# Patient Record
Sex: MALE | Race: WHITE | NOT HISPANIC OR LATINO
[De-identification: names, ages, dates, MRNs, and addresses within clinical notes are randomized per-mention and may not be internally consistent; named-entity substitution may affect disease eponyms.]

---

## 2018-01-16 ENCOUNTER — TRANSCRIPTION ENCOUNTER (OUTPATIENT)
Age: 43
End: 2018-01-16

## 2018-01-17 ENCOUNTER — APPOINTMENT (OUTPATIENT)
Dept: OTOLARYNGOLOGY | Facility: CLINIC | Age: 43
End: 2018-01-17
Payer: COMMERCIAL

## 2018-01-17 VITALS
DIASTOLIC BLOOD PRESSURE: 81 MMHG | BODY MASS INDEX: 37.1 KG/M2 | SYSTOLIC BLOOD PRESSURE: 118 MMHG | WEIGHT: 265 LBS | HEIGHT: 71 IN | HEART RATE: 128 BPM | TEMPERATURE: 97.7 F

## 2018-01-17 DIAGNOSIS — Z86.39 PERSONAL HISTORY OF OTHER ENDOCRINE, NUTRITIONAL AND METABOLIC DISEASE: ICD-10-CM

## 2018-01-17 DIAGNOSIS — Z83.3 FAMILY HISTORY OF DIABETES MELLITUS: ICD-10-CM

## 2018-01-17 DIAGNOSIS — J32.2 CHRONIC ETHMOIDAL SINUSITIS: ICD-10-CM

## 2018-01-17 DIAGNOSIS — J34.2 DEVIATED NASAL SEPTUM: ICD-10-CM

## 2018-01-17 DIAGNOSIS — Z80.6 FAMILY HISTORY OF LEUKEMIA: ICD-10-CM

## 2018-01-17 DIAGNOSIS — F15.90 OTHER STIMULANT USE, UNSPECIFIED, UNCOMPLICATED: ICD-10-CM

## 2018-01-17 DIAGNOSIS — Z82.49 FAMILY HISTORY OF ISCHEMIC HEART DISEASE AND OTHER DISEASES OF THE CIRCULATORY SYSTEM: ICD-10-CM

## 2018-01-17 DIAGNOSIS — J32.0 CHRONIC MAXILLARY SINUSITIS: ICD-10-CM

## 2018-01-17 PROBLEM — Z00.00 ENCOUNTER FOR PREVENTIVE HEALTH EXAMINATION: Status: ACTIVE | Noted: 2018-01-17

## 2018-01-17 PROCEDURE — 99204 OFFICE O/P NEW MOD 45 MIN: CPT | Mod: 25

## 2018-01-17 PROCEDURE — 31231 NASAL ENDOSCOPY DX: CPT

## 2018-01-17 RX ORDER — AMOXICILLIN AND CLAVULANATE POTASSIUM 875; 125 MG/1; MG/1
875-125 TABLET, COATED ORAL
Refills: 0 | Status: ACTIVE | COMMUNITY

## 2018-01-17 RX ORDER — PREDNISONE 10 MG/1
10 TABLET ORAL
Qty: 25 | Refills: 0 | Status: ACTIVE | COMMUNITY
Start: 2018-01-17 | End: 1900-01-01

## 2018-01-17 RX ORDER — AMOXICILLIN AND CLAVULANATE POTASSIUM 875; 125 MG/1; MG/1
875-125 TABLET, COATED ORAL
Qty: 28 | Refills: 1 | Status: ACTIVE | COMMUNITY
Start: 2018-01-17 | End: 1900-01-01

## 2018-01-19 ENCOUNTER — TRANSCRIPTION ENCOUNTER (OUTPATIENT)
Age: 43
End: 2018-01-19

## 2019-01-01 ENCOUNTER — TRANSCRIPTION ENCOUNTER (OUTPATIENT)
Age: 44
End: 2019-01-01

## 2019-06-06 ENCOUNTER — TRANSCRIPTION ENCOUNTER (OUTPATIENT)
Age: 44
End: 2019-06-06

## 2021-01-06 ENCOUNTER — TRANSCRIPTION ENCOUNTER (OUTPATIENT)
Age: 46
End: 2021-01-06

## 2021-01-10 ENCOUNTER — TRANSCRIPTION ENCOUNTER (OUTPATIENT)
Age: 46
End: 2021-01-10

## 2021-01-13 ENCOUNTER — TRANSCRIPTION ENCOUNTER (OUTPATIENT)
Age: 46
End: 2021-01-13

## 2021-10-11 ENCOUNTER — INPATIENT (INPATIENT)
Facility: HOSPITAL | Age: 46
LOS: 6 days | Discharge: HOME | DRG: 481 | End: 2021-10-18
Attending: SPECIALIST | Admitting: ORTHOPAEDIC SURGERY
Payer: COMMERCIAL

## 2021-10-11 VITALS
HEART RATE: 69 BPM | RESPIRATION RATE: 18 BRPM | OXYGEN SATURATION: 100 % | SYSTOLIC BLOOD PRESSURE: 143 MMHG | DIASTOLIC BLOOD PRESSURE: 84 MMHG | TEMPERATURE: 98 F | WEIGHT: 229.06 LBS | HEIGHT: 71 IN
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
## 2021-10-11 DIAGNOSIS — E78.5 HYPERLIPIDEMIA, UNSPECIFIED: ICD-10-CM

## 2021-10-11 DIAGNOSIS — B34.8 OTHER VIRAL INFECTIONS OF UNSPECIFIED SITE: ICD-10-CM

## 2021-10-11 DIAGNOSIS — E66.9 OBESITY, UNSPECIFIED: ICD-10-CM

## 2021-10-11 DIAGNOSIS — J06.9 ACUTE UPPER RESPIRATORY INFECTION, UNSPECIFIED: ICD-10-CM

## 2021-10-11 DIAGNOSIS — S72.422A DISPLACED FRACTURE OF LATERAL CONDYLE OF LEFT FEMUR, INITIAL ENCOUNTER FOR CLOSED FRACTURE: ICD-10-CM

## 2021-10-11 DIAGNOSIS — F41.9 ANXIETY DISORDER, UNSPECIFIED: ICD-10-CM
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
## 2021-10-11 DIAGNOSIS — E78.00 PURE HYPERCHOLESTEROLEMIA, UNSPECIFIED: ICD-10-CM
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
## 2021-10-11 DIAGNOSIS — S82.002A UNSPECIFIED FRACTURE OF LEFT PATELLA, INITIAL ENCOUNTER FOR CLOSED FRACTURE: ICD-10-CM
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
## 2021-10-11 DIAGNOSIS — S72.413A DISPLACED UNSPECIFIED CONDYLE FRACTURE OF LOWER END OF UNSPECIFIED FEMUR, INITIAL ENCOUNTER FOR CLOSED FRACTURE: ICD-10-CM

## 2021-10-11 DIAGNOSIS — E87.1 HYPO-OSMOLALITY AND HYPONATREMIA: ICD-10-CM

## 2021-10-11 LAB
ALBUMIN SERPL ELPH-MCNC: 4.3 G/DL — SIGNIFICANT CHANGE UP (ref 3.3–5)
ALP SERPL-CCNC: 69 U/L — SIGNIFICANT CHANGE UP (ref 40–120)
ALT FLD-CCNC: 28 U/L — SIGNIFICANT CHANGE UP (ref 10–45)
ANION GAP SERPL CALC-SCNC: 10 MMOL/L — SIGNIFICANT CHANGE UP (ref 5–17)
APTT BLD: 31.6 SEC — SIGNIFICANT CHANGE UP (ref 27.5–35.5)
AST SERPL-CCNC: 36 U/L — SIGNIFICANT CHANGE UP (ref 10–40)
BASOPHILS # BLD AUTO: 0.02 K/UL — SIGNIFICANT CHANGE UP (ref 0–0.2)
BASOPHILS NFR BLD AUTO: 0.1 % — SIGNIFICANT CHANGE UP (ref 0–2)
BILIRUB SERPL-MCNC: 0.4 MG/DL — SIGNIFICANT CHANGE UP (ref 0.2–1.2)
BLD GP AB SCN SERPL QL: NEGATIVE — SIGNIFICANT CHANGE UP
BUN SERPL-MCNC: 12 MG/DL — SIGNIFICANT CHANGE UP (ref 7–23)
CALCIUM SERPL-MCNC: 9.4 MG/DL — SIGNIFICANT CHANGE UP (ref 8.4–10.5)
CHLORIDE SERPL-SCNC: 103 MMOL/L — SIGNIFICANT CHANGE UP (ref 96–108)
CO2 SERPL-SCNC: 25 MMOL/L — SIGNIFICANT CHANGE UP (ref 22–31)
CREAT SERPL-MCNC: 0.98 MG/DL — SIGNIFICANT CHANGE UP (ref 0.5–1.3)
EOSINOPHIL # BLD AUTO: 0 K/UL — SIGNIFICANT CHANGE UP (ref 0–0.5)
EOSINOPHIL NFR BLD AUTO: 0 % — SIGNIFICANT CHANGE UP (ref 0–6)
GLUCOSE SERPL-MCNC: 119 MG/DL — HIGH (ref 70–99)
HCT VFR BLD CALC: 44.1 % — SIGNIFICANT CHANGE UP (ref 39–50)
HCT VFR BLD CALC: 44.5 % — SIGNIFICANT CHANGE UP (ref 39–50)
HGB BLD-MCNC: 15.2 G/DL — SIGNIFICANT CHANGE UP (ref 13–17)
HGB BLD-MCNC: 15.7 G/DL — SIGNIFICANT CHANGE UP (ref 13–17)
IMM GRANULOCYTES NFR BLD AUTO: 0.5 % — SIGNIFICANT CHANGE UP (ref 0–1.5)
INR BLD: 1.07 — SIGNIFICANT CHANGE UP (ref 0.88–1.16)
LYMPHOCYTES # BLD AUTO: 1.1 K/UL — SIGNIFICANT CHANGE UP (ref 1–3.3)
LYMPHOCYTES # BLD AUTO: 6.6 % — LOW (ref 13–44)
MCHC RBC-ENTMCNC: 29.2 PG — SIGNIFICANT CHANGE UP (ref 27–34)
MCHC RBC-ENTMCNC: 30.4 PG — SIGNIFICANT CHANGE UP (ref 27–34)
MCHC RBC-ENTMCNC: 34.2 GM/DL — SIGNIFICANT CHANGE UP (ref 32–36)
MCHC RBC-ENTMCNC: 35.6 GM/DL — SIGNIFICANT CHANGE UP (ref 32–36)
MCV RBC AUTO: 85.5 FL — SIGNIFICANT CHANGE UP (ref 80–100)
MCV RBC AUTO: 85.6 FL — SIGNIFICANT CHANGE UP (ref 80–100)
MONOCYTES # BLD AUTO: 0.83 K/UL — SIGNIFICANT CHANGE UP (ref 0–0.9)
MONOCYTES NFR BLD AUTO: 5 % — SIGNIFICANT CHANGE UP (ref 2–14)
NEUTROPHILS # BLD AUTO: 14.63 K/UL — HIGH (ref 1.8–7.4)
NEUTROPHILS NFR BLD AUTO: 87.8 % — HIGH (ref 43–77)
NRBC # BLD: 0 /100 WBCS — SIGNIFICANT CHANGE UP (ref 0–0)
NRBC # BLD: 0 /100 WBCS — SIGNIFICANT CHANGE UP (ref 0–0)
PLATELET # BLD AUTO: 305 K/UL — SIGNIFICANT CHANGE UP (ref 150–400)
PLATELET # BLD AUTO: 320 K/UL — SIGNIFICANT CHANGE UP (ref 150–400)
POTASSIUM SERPL-MCNC: 4 MMOL/L — SIGNIFICANT CHANGE UP (ref 3.5–5.3)
POTASSIUM SERPL-SCNC: 4 MMOL/L — SIGNIFICANT CHANGE UP (ref 3.5–5.3)
PROT SERPL-MCNC: 7.5 G/DL — SIGNIFICANT CHANGE UP (ref 6–8.3)
PROTHROM AB SERPL-ACNC: 12.8 SEC — SIGNIFICANT CHANGE UP (ref 10.6–13.6)
RBC # BLD: 5.16 M/UL — SIGNIFICANT CHANGE UP (ref 4.2–5.8)
RBC # BLD: 5.2 M/UL — SIGNIFICANT CHANGE UP (ref 4.2–5.8)
RBC # FLD: 12 % — SIGNIFICANT CHANGE UP (ref 10.3–14.5)
RBC # FLD: 12.1 % — SIGNIFICANT CHANGE UP (ref 10.3–14.5)
RH IG SCN BLD-IMP: POSITIVE — SIGNIFICANT CHANGE UP
RH IG SCN BLD-IMP: POSITIVE — SIGNIFICANT CHANGE UP
SARS-COV-2 RNA SPEC QL NAA+PROBE: NEGATIVE — SIGNIFICANT CHANGE UP
SODIUM SERPL-SCNC: 138 MMOL/L — SIGNIFICANT CHANGE UP (ref 135–145)
WBC # BLD: 16.06 K/UL — HIGH (ref 3.8–10.5)
WBC # BLD: 16.67 K/UL — HIGH (ref 3.8–10.5)
WBC # FLD AUTO: 16.06 K/UL — HIGH (ref 3.8–10.5)
WBC # FLD AUTO: 16.67 K/UL — HIGH (ref 3.8–10.5)

## 2021-10-11 PROCEDURE — 71045 X-RAY EXAM CHEST 1 VIEW: CPT | Mod: 26

## 2021-10-11 PROCEDURE — 73700 CT LOWER EXTREMITY W/O DYE: CPT | Mod: 26,LT,MA

## 2021-10-11 PROCEDURE — 73560 X-RAY EXAM OF KNEE 1 OR 2: CPT | Mod: 26,59,LT

## 2021-10-11 PROCEDURE — 99221 1ST HOSP IP/OBS SF/LOW 40: CPT

## 2021-10-11 PROCEDURE — 99285 EMERGENCY DEPT VISIT HI MDM: CPT

## 2021-10-11 PROCEDURE — 73564 X-RAY EXAM KNEE 4 OR MORE: CPT | Mod: 26,LT

## 2021-10-11 RX ORDER — SODIUM CHLORIDE 9 MG/ML
1000 INJECTION, SOLUTION INTRAVENOUS
Refills: 0 | Status: DISCONTINUED | OUTPATIENT
Start: 2021-10-11 | End: 2021-10-12

## 2021-10-11 RX ORDER — OXYCODONE HYDROCHLORIDE 5 MG/1
5 TABLET ORAL EVERY 4 HOURS
Refills: 0 | Status: DISCONTINUED | OUTPATIENT
Start: 2021-10-11 | End: 2021-10-15

## 2021-10-11 RX ORDER — HEPARIN SODIUM 5000 [USP'U]/ML
5000 INJECTION INTRAVENOUS; SUBCUTANEOUS EVERY 8 HOURS
Refills: 0 | Status: DISCONTINUED | OUTPATIENT
Start: 2021-10-11 | End: 2021-10-12

## 2021-10-11 RX ORDER — ACETAMINOPHEN 500 MG
650 TABLET ORAL EVERY 6 HOURS
Refills: 0 | Status: DISCONTINUED | OUTPATIENT
Start: 2021-10-11 | End: 2021-10-15

## 2021-10-11 RX ORDER — MORPHINE SULFATE 50 MG/1
6 CAPSULE, EXTENDED RELEASE ORAL ONCE
Refills: 0 | Status: DISCONTINUED | OUTPATIENT
Start: 2021-10-11 | End: 2021-10-11

## 2021-10-11 RX ORDER — INFLUENZA VIRUS VACCINE 15; 15; 15; 15 UG/.5ML; UG/.5ML; UG/.5ML; UG/.5ML
0.5 SUSPENSION INTRAMUSCULAR ONCE
Refills: 0 | Status: COMPLETED | OUTPATIENT
Start: 2021-10-11 | End: 2021-10-11

## 2021-10-11 RX ORDER — OXYCODONE HYDROCHLORIDE 5 MG/1
10 TABLET ORAL EVERY 4 HOURS
Refills: 0 | Status: DISCONTINUED | OUTPATIENT
Start: 2021-10-11 | End: 2021-10-15

## 2021-10-11 RX ORDER — HYDROMORPHONE HYDROCHLORIDE 2 MG/ML
0.5 INJECTION INTRAMUSCULAR; INTRAVENOUS; SUBCUTANEOUS ONCE
Refills: 0 | Status: DISCONTINUED | OUTPATIENT
Start: 2021-10-11 | End: 2021-10-11

## 2021-10-11 RX ORDER — HYDROMORPHONE HYDROCHLORIDE 2 MG/ML
0.5 INJECTION INTRAMUSCULAR; INTRAVENOUS; SUBCUTANEOUS EVERY 4 HOURS
Refills: 0 | Status: DISCONTINUED | OUTPATIENT
Start: 2021-10-11 | End: 2021-10-15

## 2021-10-11 RX ORDER — LANOLIN ALCOHOL/MO/W.PET/CERES
5 CREAM (GRAM) TOPICAL AT BEDTIME
Refills: 0 | Status: DISCONTINUED | OUTPATIENT
Start: 2021-10-11 | End: 2021-10-15

## 2021-10-11 RX ORDER — MAGNESIUM HYDROXIDE 400 MG/1
30 TABLET, CHEWABLE ORAL DAILY
Refills: 0 | Status: DISCONTINUED | OUTPATIENT
Start: 2021-10-11 | End: 2021-10-15

## 2021-10-11 RX ADMIN — MORPHINE SULFATE 6 MILLIGRAM(S): 50 CAPSULE, EXTENDED RELEASE ORAL at 13:20

## 2021-10-11 RX ADMIN — Medication 650 MILLIGRAM(S): at 21:35

## 2021-10-11 RX ADMIN — HYDROMORPHONE HYDROCHLORIDE 0.5 MILLIGRAM(S): 2 INJECTION INTRAMUSCULAR; INTRAVENOUS; SUBCUTANEOUS at 19:24

## 2021-10-11 RX ADMIN — Medication 650 MILLIGRAM(S): at 20:09

## 2021-10-11 RX ADMIN — MORPHINE SULFATE 6 MILLIGRAM(S): 50 CAPSULE, EXTENDED RELEASE ORAL at 12:23

## 2021-10-11 RX ADMIN — HEPARIN SODIUM 5000 UNIT(S): 5000 INJECTION INTRAVENOUS; SUBCUTANEOUS at 21:22

## 2021-10-11 RX ADMIN — HYDROMORPHONE HYDROCHLORIDE 0.5 MILLIGRAM(S): 2 INJECTION INTRAMUSCULAR; INTRAVENOUS; SUBCUTANEOUS at 13:35

## 2021-10-11 RX ADMIN — Medication 5 MILLIGRAM(S): at 22:04

## 2021-10-11 NOTE — ED ADULT NURSE NOTE - OBJECTIVE STATEMENT
Pt BIBEMS for left knee pain while working out at the gym today. Denies LOC or head trauma. B/L leg sensation intact, pulses present and able to move toes. Denies N/V/D, cp, SOB at this time.

## 2021-10-11 NOTE — H&P ADULT - PROBLEM SELECTOR PLAN 1
-Discussed with Dr. Sidhu   -Left knee patella dislocation closed reduced in the ED, Frederick CARRANZA at bedside to assist. A knee immobilizer was then applied. Patient tolerated procedure well and remained neurovascularly intact post-reduction. After reduction, left knee non-tender to palpation. Post-reduction xrays/CT showing better alignment of patella.   -CT showing left femoral condyle fracture and left patellar pole fracture   -plan to admit to orthopedic service for surgical intervention this week  -will consult medicine for co-management/medial optimization prior to OR; labs/EKG pending review by medical clearance   -pain control  -Non-weight bearing left lower extremity in knee immobilizer at all times - NO knee flexion  -pain control   -pt unsure of the doses of his home medications, states  will bring in medications this evening - will complete med rec once medications obtained  -surgical consent to be obtained once definitive surgical plan established -Discussed with Dr. Sidhu   -On admission to ED pt had left patella dislocation which was closed reduced in the ED, Frederick CARRANZA at bedside to assist. A knee immobilizer was then applied. Patient tolerated procedure well and remained neurovascularly intact post-reduction. After reduction, left knee non-tender to palpation. Post-reduction xrays/CT showing better alignment of patella.   -CT showing left femoral condyle fracture and left patellar pole fracture - plan to admit to orthopedic service for surgical intervention this week  -will consult medicine for co-management/medial optimization prior to OR; labs/EKG pending review by medical clearance   -pain control  -Non-weight bearing left lower extremity in knee immobilizer at all times - NO knee flexion  -pain control   -pt unsure of the doses of his home medications, states  will bring in medications this evening - will complete med rec once medications obtained  -surgical consent to be obtained once definitive surgical plan established -Discussed with Dr. Sidhu   -On admission to ED pt had left patella dislocation which was closed reduced in the ED, Frederick CARRANZA at bedside to assist. A knee immobilizer was then applied. Patient tolerated procedure well and remained neurovascularly intact post-reduction. After reduction, left knee non-tender to palpation. Post-reduction xrays/CT showing better alignment of patella.   -CT showing left femoral condyle fracture and left patellar pole fracture - plan to admit to orthopedic service for surgical intervention this week  -will consult medicine for co-management/medial optimization prior to OR; labs/EKG pending review by medical clearance   -pain control  -Non-weight bearing left lower extremity in knee immobilizer at all times - NO knee flexion  -pain control   -pt unsure of the doses of his home medications, states  will bring in medications this evening - will complete med rec once medications obtained  -surgical consent to be obtained once definitive surgical plan established    ADDENDUM: Patient's care being taken over by Dr. Lan

## 2021-10-11 NOTE — ED PROVIDER NOTE - PHYSICAL EXAMINATION
CONSTITUTIONAL: Well-appearing;  in no apparent distress.   HEAD: Normocephalic; atraumatic.   EYES: PERRL; EOM intact; conjunctiva and sclera clear  ENT: normal nose; no rhinorrhea; normal pharynx with no erythema or lesions.   NECK: Supple; non-tender;   CARDIOVASCULAR: rrr, no audible murmurs   RESPIRATORY: Breathing easily;   MSK: L knee - held in flexion, patella deviated laterally, +swelling, sensation intact. distal pulses intact   EXT: No cyanosis or edema; N/V intact  SKIN: Normal for age and race; warm; dry; good turgor; no apparent lesions or rash.

## 2021-10-11 NOTE — ED PROVIDER NOTE - CARE PLAN
Principal Discharge DX:	Dislocation of left patella  Secondary Diagnosis:	Fracture, femur, distal   1

## 2021-10-11 NOTE — H&P ADULT - HISTORY OF PRESENT ILLNESS
The patient is a 46M history of high cholesterol and anxiety presenting with left knee pain s/p injury at the gym. The patient states he was doing "jumping lunges" at the gym and landed wrong, falling to the floor with immediate left knee pain and inability to bear weight. Was subsequently taken to the ER by ambulance. States he has no prior history of dislocations or fractures. Currently complains of left knee pain, denies numbness/tingling of bilateral lower extremities. Denies pain in any other extremities/joints.

## 2021-10-11 NOTE — ED ADULT NURSE NOTE - NSIMPLEMENTINTERV_GEN_ALL_ED
Implemented All Fall Risk Interventions:  [place] to call system. Call bell, personal items and telephone within reach. Instruct patient to call for assistance. Room bathroom lighting operational. Non-slip footwear when patient is off stretcher. Physically safe environment: no spills, clutter or unnecessary equipment. Stretcher in lowest position, wheels locked, appropriate side rails in place. Provide visual cue, wrist band, yellow gown, etc. Monitor gait and stability. Monitor for mental status changes and reorient to person, place, and time. Review medications for side effects contributing to fall risk. Reinforce activity limits and safety measures with patient and family.

## 2021-10-11 NOTE — H&P ADULT - ASSESSMENT
46M with left patella dislocation s/p closed reduction in the ED and left femoral condyle fracture  46M with left distal femur fracture

## 2021-10-11 NOTE — ED ADULT NURSE NOTE - CAS EDP DISCH DISPOSITION ADMI
Met with patient and wife with Dr. Mejias.   Discussed risks/benefits of undergoing a biopsy of the liver if not pursing Discussed advance care planning CPR/ intubation. Patient states he has been discussed this in the past. He would not want such interventions, verbalized would want to be a DNR.   Reviewed MOLST and signed by patient [facility]

## 2021-10-11 NOTE — H&P ADULT - NSHPPHYSICALEXAM_GEN_ALL_CORE
Physical Exam:  Gen: NAD, lying on bed in the ED, appears to be in pain   MSK: left leg held in flexion, +deformity to left knee, patella appears displaced laterally. No open wounds, lesions to bilateral lower extremities. DP pulse palpable left lower extremity. Left knee non-tender to palpation. Left tibia non-tender to palpation. Left ankle/foot non-tender to palpation.  Sensation intact and equal to bilateral lower extremities. Wiggling left toes, firing left EHL/FHL.

## 2021-10-11 NOTE — ED ADULT NURSE NOTE - CHIEF COMPLAINT QUOTE
47 y/o male BIBEMS for evaluation of left knee pain s/p working out at GYM, obvious deformity noted.

## 2021-10-11 NOTE — ED PROVIDER NOTE - CLINICAL SUMMARY MEDICAL DECISION MAKING FREE TEXT BOX
45 yo M with no pmh c/o L knee pain after doing jumping lunges at the gym. Pts knee gave out and he thinks the knee dislocated.  Admits to pain. Unable to straighten knee. Denies numbness, tingling. L knee - held in flexion, patella deviated laterally, +swelling, sensation intact. distal pulses intact 47 yo M with no pmh c/o L knee pain after doing jumping lunges at the gym. Pts knee gave out and he thinks the knee dislocated.  Admits to pain. Unable to straighten knee. Denies numbness, tingling. L knee - held in flexion, patella deviated laterally, +swelling, sensation intact. distal pulses intact. XR, ortho eval

## 2021-10-11 NOTE — H&P ADULT - NSHPLABSRESULTS_GEN_ALL_CORE
< from: Xray Knee 1 or 2 Views, Left (10.11.21 @ 13:08) >      EXAM:  XR KNEE 1-2 VIEWS LT                          PROCEDURE DATE:  10/11/2021          INTERPRETATION:  INDICATION: Left knee pain    TECHNIQUE: 2 views of the left knee    COMPARISON: None available    FINDINGS:    There is a fracture of the lateral femoral condyle minimal inferomedial displacement measuring approximately 7 mm. There is lateral and inferior subluxation of the patella.  There is no focal soft tissue abnormality.      IMPRESSION:    Mildly displaced lateral femoral condyle fracture.    --- End of Report ---    < from: Xray Knee 4 Views, Left (10.11.21 @ 14:26) >    EXAM:  XR KNEE COMP 4+ VIEWS LT         PROCEDURE DATE:  10/11/2021    INTERPRETATION:  INDICATION: Left knee fracture status post reduction  TECHNIQUE: 4 views of the left knee  COMPARISON: 10/11/2021  FINDINGS:  Interval reduction and restoration of normal knee alignment. There is a persistent fracture of the lateral femoral condyle articular surface. No new fracture or dislocation. Persistent lipohemarthrosis..  There is no focal soft tissue abnormality.  IMPRESSION:  Interval reduction and restoration of normal knee alignment. There is a persistent fracture of the lateral femoral condyle articular surface.  MANINDER CARLSON MD; Attending Radiologist  This document has been electronically signed. Oct 11 2021  2:31PM    Thank you for the opportunity to participate in the care of this patient  EXAM:  CT KNEE ONLY LT                        PROCEDURE DATE:  10/11/2021    INTERPRETATION:  INDICATION: Left knee pain  TECHNIQUE: Axial images through the left knee were obtained without use of intravenous contrast, according to standard protocol. Sagittal and coronal reformats were obtained from the primary axial data set.  COMPARISON: None available  FINDINGS:  There is a fracture of the lateral femoral condyle articular surface with approximately 6 mm of medial displacement and 3 mm of anterior displacement of the major fracture fragment. No significant step-off of the articular surface. There is also a minimally displaced chip fracture off of the inferomedial patellar pole. No additional fracture or dislocation. Thereis a small lipohemarthrosis..  IMPRESSION:  Mildly displaced fractures of the lateral femoral condyle and inferomedial patellar pole, as above.    MANINDER CARLSON MD; Attending Radiologist  This document has been electronically signed. Oct 11 2021  2:43PM      Preop labs/EKG pending

## 2021-10-11 NOTE — ED PROVIDER NOTE - ATTENDING CONTRIBUTION TO CARE
45 yo hx of anxiety, s/p - jumping lunges when L knee gave out and caused dislocation. Agree w PA exam as above, NVI, will obtain XR, ortho eval, reassess.

## 2021-10-11 NOTE — ED ADULT NURSE NOTE - NS ED NURSE RECORD ANOTHER VITAL SIGN
[FreeTextEntry1] : Start antibiotic today. May use Tylenol or Motrin p.r.n. pain or fever. Call immediately if any worsening of signs or symptoms. Parent understands the plan. Yes

## 2021-10-11 NOTE — ED PROVIDER NOTE - OBJECTIVE STATEMENT
47 yo M with no pmh c/o L knee pain after doing jumping lunges at the gym. Pts knee gave out and he thinks the knee dislocated.  Admits to pain. Unable to straighten knee. Denies numbness, tingling.

## 2021-10-11 NOTE — ED ADULT TRIAGE NOTE - CHIEF COMPLAINT QUOTE
45 y/o male BIBEMS for evaluation of left knee pain s/p working out at GYM, obvious deformity noted.

## 2021-10-12 ENCOUNTER — TRANSCRIPTION ENCOUNTER (OUTPATIENT)
Age: 46
End: 2021-10-12

## 2021-10-12 LAB
ANION GAP SERPL CALC-SCNC: 11 MMOL/L — SIGNIFICANT CHANGE UP (ref 5–17)
APPEARANCE UR: CLEAR — SIGNIFICANT CHANGE UP
BILIRUB UR-MCNC: NEGATIVE — SIGNIFICANT CHANGE UP
BUN SERPL-MCNC: 9 MG/DL — SIGNIFICANT CHANGE UP (ref 7–23)
CALCIUM SERPL-MCNC: 9.6 MG/DL — SIGNIFICANT CHANGE UP (ref 8.4–10.5)
CHLORIDE SERPL-SCNC: 104 MMOL/L — SIGNIFICANT CHANGE UP (ref 96–108)
CO2 SERPL-SCNC: 24 MMOL/L — SIGNIFICANT CHANGE UP (ref 22–31)
COLOR SPEC: YELLOW — SIGNIFICANT CHANGE UP
COVID-19 SPIKE DOMAIN AB INTERP: POSITIVE
COVID-19 SPIKE DOMAIN ANTIBODY RESULT: >250 U/ML — HIGH
CREAT SERPL-MCNC: 0.88 MG/DL — SIGNIFICANT CHANGE UP (ref 0.5–1.3)
DIFF PNL FLD: NEGATIVE — SIGNIFICANT CHANGE UP
GLUCOSE SERPL-MCNC: 113 MG/DL — HIGH (ref 70–99)
GLUCOSE UR QL: NEGATIVE — SIGNIFICANT CHANGE UP
HCT VFR BLD CALC: 46.4 % — SIGNIFICANT CHANGE UP (ref 39–50)
HGB BLD-MCNC: 15.9 G/DL — SIGNIFICANT CHANGE UP (ref 13–17)
KETONES UR-MCNC: NEGATIVE — SIGNIFICANT CHANGE UP
LEUKOCYTE ESTERASE UR-ACNC: NEGATIVE — SIGNIFICANT CHANGE UP
MCHC RBC-ENTMCNC: 29.3 PG — SIGNIFICANT CHANGE UP (ref 27–34)
MCHC RBC-ENTMCNC: 34.3 GM/DL — SIGNIFICANT CHANGE UP (ref 32–36)
MCV RBC AUTO: 85.5 FL — SIGNIFICANT CHANGE UP (ref 80–100)
NITRITE UR-MCNC: NEGATIVE — SIGNIFICANT CHANGE UP
NRBC # BLD: 0 /100 WBCS — SIGNIFICANT CHANGE UP (ref 0–0)
PH UR: 7.5 — SIGNIFICANT CHANGE UP (ref 5–8)
PLATELET # BLD AUTO: 300 K/UL — SIGNIFICANT CHANGE UP (ref 150–400)
POTASSIUM SERPL-MCNC: 3.5 MMOL/L — SIGNIFICANT CHANGE UP (ref 3.5–5.3)
POTASSIUM SERPL-SCNC: 3.5 MMOL/L — SIGNIFICANT CHANGE UP (ref 3.5–5.3)
PROT UR-MCNC: NEGATIVE MG/DL — SIGNIFICANT CHANGE UP
RBC # BLD: 5.43 M/UL — SIGNIFICANT CHANGE UP (ref 4.2–5.8)
RBC # FLD: 12.4 % — SIGNIFICANT CHANGE UP (ref 10.3–14.5)
SARS-COV-2 IGG+IGM SERPL QL IA: >250 U/ML — HIGH
SARS-COV-2 IGG+IGM SERPL QL IA: POSITIVE
SODIUM SERPL-SCNC: 139 MMOL/L — SIGNIFICANT CHANGE UP (ref 135–145)
SP GR SPEC: 1.02 — SIGNIFICANT CHANGE UP (ref 1–1.03)
UROBILINOGEN FLD QL: 0.2 E.U./DL — SIGNIFICANT CHANGE UP
WBC # BLD: 13.3 K/UL — HIGH (ref 3.8–10.5)
WBC # FLD AUTO: 13.3 K/UL — HIGH (ref 3.8–10.5)

## 2021-10-12 PROCEDURE — 99223 1ST HOSP IP/OBS HIGH 75: CPT

## 2021-10-12 RX ORDER — CHOLECALCIFEROL (VITAMIN D3) 125 MCG
5000 CAPSULE ORAL DAILY
Refills: 0 | Status: DISCONTINUED | OUTPATIENT
Start: 2021-10-12 | End: 2021-10-15

## 2021-10-12 RX ORDER — SENNA PLUS 8.6 MG/1
2 TABLET ORAL AT BEDTIME
Refills: 0 | Status: DISCONTINUED | OUTPATIENT
Start: 2021-10-12 | End: 2021-10-15

## 2021-10-12 RX ORDER — SODIUM CHLORIDE 9 MG/ML
1000 INJECTION, SOLUTION INTRAVENOUS
Refills: 0 | Status: DISCONTINUED | OUTPATIENT
Start: 2021-10-15 | End: 2021-10-15

## 2021-10-12 RX ORDER — HEPARIN SODIUM 5000 [USP'U]/ML
5000 INJECTION INTRAVENOUS; SUBCUTANEOUS EVERY 8 HOURS
Refills: 0 | Status: DISCONTINUED | OUTPATIENT
Start: 2021-10-12 | End: 2021-10-12

## 2021-10-12 RX ORDER — HEPARIN SODIUM 5000 [USP'U]/ML
5000 INJECTION INTRAVENOUS; SUBCUTANEOUS EVERY 8 HOURS
Refills: 0 | Status: COMPLETED | OUTPATIENT
Start: 2021-10-12 | End: 2021-10-14

## 2021-10-12 RX ORDER — POLYETHYLENE GLYCOL 3350 17 G/17G
17 POWDER, FOR SOLUTION ORAL DAILY
Refills: 0 | Status: DISCONTINUED | OUTPATIENT
Start: 2021-10-12 | End: 2021-10-15

## 2021-10-12 RX ORDER — SERTRALINE 25 MG/1
50 TABLET, FILM COATED ORAL DAILY
Refills: 0 | Status: DISCONTINUED | OUTPATIENT
Start: 2021-10-12 | End: 2021-10-15

## 2021-10-12 RX ADMIN — OXYCODONE HYDROCHLORIDE 5 MILLIGRAM(S): 5 TABLET ORAL at 13:35

## 2021-10-12 RX ADMIN — HEPARIN SODIUM 5000 UNIT(S): 5000 INJECTION INTRAVENOUS; SUBCUTANEOUS at 13:35

## 2021-10-12 RX ADMIN — HEPARIN SODIUM 5000 UNIT(S): 5000 INJECTION INTRAVENOUS; SUBCUTANEOUS at 05:19

## 2021-10-12 RX ADMIN — OXYCODONE HYDROCHLORIDE 5 MILLIGRAM(S): 5 TABLET ORAL at 21:17

## 2021-10-12 RX ADMIN — OXYCODONE HYDROCHLORIDE 5 MILLIGRAM(S): 5 TABLET ORAL at 22:00

## 2021-10-12 RX ADMIN — HEPARIN SODIUM 5000 UNIT(S): 5000 INJECTION INTRAVENOUS; SUBCUTANEOUS at 21:17

## 2021-10-12 RX ADMIN — OXYCODONE HYDROCHLORIDE 5 MILLIGRAM(S): 5 TABLET ORAL at 14:30

## 2021-10-12 RX ADMIN — SERTRALINE 50 MILLIGRAM(S): 25 TABLET, FILM COATED ORAL at 13:35

## 2021-10-12 RX ADMIN — SENNA PLUS 2 TABLET(S): 8.6 TABLET ORAL at 21:17

## 2021-10-12 RX ADMIN — Medication 5000 UNIT(S): at 17:27

## 2021-10-12 NOTE — DISCHARGE NOTE PROVIDER - NSDCFUADDINST_GEN_ALL_CORE_FT
Non weight-bearing with left leg in immobilizer.  No strenuous activity, heavy lifting, driving or returning to work until cleared by MD.  You may shower - dressing is water-resistant, no soaking in bathtubs.  Remove dressing after post op day 5-7, then leave incision open to air. Keep incision clean and dry.  Try to have regular bowel movements, take stool softener or laxative if necessary.  May take Pepcid or Zantac for upset stomach.  May take Aleve or Naproxen instead of Meloxicam/Celebrex.  Swelling may travel all the way down leg to foot, this is normal and will subside in a few weeks.  Call to schedule an appt with Dr. Lan for follow up, if you have staples or sutures they will be removed in office.  Contact your doctor if you experience: fever greater than 101.5, chills, chest pain, difficulty breathing, redness or excessive drainage around the incision, other concerns.  Follow up with your primary care provider.   Non weight-bearing with left leg in immobilizer at all times.  No strenuous activity, heavy lifting, driving or returning to work until cleared by MD.    You were prescribed lovenox daily for DVT prophylaxis. You should take this injection once daily to help prevent blood clots from being immobile. You were given 1 weeks worth of this medicine. You should follow-up with Dr. Reaves within 1 week. Additionally, this medication should be stopped within 24 hours of surgery.    Try to have regular bowel movements, take stool softener or laxative if necessary.    Swelling may travel all the way down leg to foot, this is normal and will subside in a few weeks.  Call to schedule an appt with Dr. Reaves for follow up.  Contact your doctor if you experience: fever greater than 101.5, chills, chest pain, difficulty breathing, redness or excessive drainage around the incision, other concerns.  Follow up with your primary care provider.   Non weight-bearing on left leg with leg in immobilizer at all times.  No strenuous activity, heavy lifting, driving or returning to work until cleared by MD.  Keep dressing clean and dry. No pools, soaking in bathtubs.  Try to have regular bowel movements, take stool softener or laxative if necessary.  May take Pepcid or Zantac for upset stomach.    Swelling may travel all the way down leg to foot, this is normal and will subside in a few weeks.  Call to schedule an appt with Dr. Lan for follow up, if you have staples or sutures they will be removed in office.  Contact your doctor if you experience: fever greater than 101.5, chills, chest pain, difficulty breathing, redness or excessive drainage around the incision, other concerns.  Follow up with your primary care provider.

## 2021-10-12 NOTE — DISCHARGE NOTE PROVIDER - NSDCHC_MEDRECSTATUS_GEN_ALL_CORE
palpitations Admission Reconciliation is Completed  Discharge Reconciliation is Not Complete Admission Reconciliation is Completed  Discharge Reconciliation is Completed

## 2021-10-12 NOTE — CONSULT NOTE ADULT - SUBJECTIVE AND OBJECTIVE BOX
Orthopaedic Surgery Consult Note    For Surgeon: Dr. Sidhu    HPI:  The patient is a 46M history of high cholesterol and anxiety presenting with left knee pain s/p injury at the gym. The patient states he was doing "jumping lunges" at the gym and landed wrong, falling to the floor with immediate left knee pain and inability to bear weight. Was subsequently taken to the ER by ambulance. States he has no prior history of dislocations or fractures. Currently complains of left knee pain, denies numbness/tingling of bilateral lower extremities.     Allergies:  No Known Allergies    PAST MEDICAL & SURGICAL HISTORY:  Anxiety  High cholesterol     MEDICATIONS  (STANDING):  Statin  Ozempic  sertraline     Vital Signs Last 24 Hrs  T(C): 36.9 (11 Oct 2021 11:41), Max: 36.9 (11 Oct 2021 11:41)  T(F): 98.4 (11 Oct 2021 11:41), Max: 98.4 (11 Oct 2021 11:41)  HR: 72 (11 Oct 2021 13:34) (69 - 72)  BP: 119/73 (11 Oct 2021 13:34) (119/73 - 143/84)  BP(mean): --  RR: 18 (11 Oct 2021 13:34) (18 - 18)  SpO2: 99% (11 Oct 2021 13:34) (99% - 100%)    Physical Exam:  Gen: NAD, lying on bed in the ED, appears to be in pain   MSK: left leg held in flexion, +deformity to left knee, patella appears displaced laterally. No open wounds, lesions to bilateral lower extremities. DP pulse palpable left lower extremity. Sensation intact and equal to bilateral lower extremities. Wiggling left toes, firing left EHL/FHL.     Imaging:   Left knee xray: left knee patella dislocation, questionable left lateral condyle fracture     A/P: 46yMale with left patella dislocation s/p injury at gym   -Discussed with Dr. Sidhu   -Left knee patella dislocation closed reduced in the ED, Frederick CARRANZA at bedside to assist. A knee immobilizer was then applied. Patient tolerated procedure well and remained neurovascularly intact post-reduction. After reduction, left knee non-tender to palpation.   -will obtain post-reduction xrays and CT scan  -further plan pending imaging  -pain control  -left lower extremity in knee immobilizer - NO knee flexion; for now will keep non-weight bearing until further imaging   -pain control         
HPI "The patient is a 46M history of high cholesterol and anxiety presenting with left knee pain s/p injury at the gym. The patient states he was doing "jumping lunges" at the gym and landed wrong, falling to the floor with immediate left knee pain and inability to bear weight. Was subsequently taken to the ER by ambulance. States he has no prior history of dislocations or fractures. Currently complains of left knee pain, denies numbness/tingling of bilateral lower extremities. Denies pain in any other extremities/joints.  (11 Oct 2021 15:50)"    46M w h/o HLD not on medications, Anxiety, Obesity (32) p/w L knee pain after injury at the gym found to have patellar dislocation and LFC fracture - for OR w Dr. Lan - co-management called for pre-op evaluation    Pt states he was doing foward lunges when he felt a sudden pain in his L knee and inability to bear weight. In the ED found to have dislocated patella which was reduced. Also noted to have fracture of the LFC and inferomedial patellar pole. Admitted to orthopedics. Pt states he has been in his usual state of health and recently began to exercise more. Able to walk up 4 flights of stairs (elevator was broken and now fixed) without any issue. He is taking sertraline 20mg for anxiety. Taking only fish oil and vitamin D supplements. Also on weekly ozempic injection for weight loss. Otherwise pt states pain is controlled today - 2/10. Denies any numbness. No fever, cough. chest pain, palpitations, no lightheadedness, dizziness. No N/V/Abd pain. Denies any dysuria.     PMD: Dr. Camilo Vance - [facility]  PMH: HLD, Anxiety. COVID in Dec 2020-2021  PSH: None  Allergies: NKDA  MEDS: sertraline 20mg daily, fish oil, vitamin D  FH: father w CAD. Denies any VTE/PE  SH: Lives in elevator building (1 step) with  Hamzah. 2 children. Works as  for [employer] firm      PAST MEDICAL & SURGICAL HISTORY:  Hypercholesteremia    Anxiety      Home Meds: Home Medications:    Allergies: Allergies    No Known Allergies    Intolerances      Soc:   Advanced Directives: Presumed Full Code     CURRENT MEDICATIONS:   --------------------------------------------------------------------------------------  Neurologic Medications  acetaminophen   Tablet .. 650 milliGRAM(s) Oral every 6 hours PRN Temp greater or equal to 38C (100.4F), Mild Pain (1 - 3)  HYDROmorphone  Injectable 0.5 milliGRAM(s) IV Push every 4 hours PRN breakthrough pain  melatonin 5 milliGRAM(s) Oral at bedtime PRN Sleep  oxyCODONE    IR 10 milliGRAM(s) Oral every 4 hours PRN Severe Pain (7 - 10)  oxyCODONE    IR 5 milliGRAM(s) Oral every 4 hours PRN Moderate Pain (4 - 6)    Respiratory Medications    Cardiovascular Medications    Gastrointestinal Medications  lactated ringers. 1000 milliLiter(s) IV Continuous <Continuous>  magnesium hydroxide Suspension 30 milliLiter(s) Oral daily PRN Constipation    Genitourinary Medications    Hematologic/Oncologic Medications  heparin   Injectable 5000 Unit(s) SubCutaneous every 8 hours  influenza   Vaccine 0.5 milliLiter(s) IntraMuscular once    Antimicrobial/Immunologic Medications    Endocrine/Metabolic Medications    Topical/Other Medications    --------------------------------------------------------------------------------------    VITAL SIGNS, INS/OUTS (last 24 hours):  --------------------------------------------------------------------------------------  ICU Vital Signs Last 24 Hrs  T(C): 36.9 (12 Oct 2021 05:06), Max: 37.1 (11 Oct 2021 19:16)  T(F): 98.5 (12 Oct 2021 05:06), Max: 98.7 (11 Oct 2021 19:16)  HR: 96 (12 Oct 2021 05:06) (69 - 110)  BP: 128/82 (12 Oct 2021 05:06) (111/55 - 143/84)  BP(mean): 100 (11 Oct 2021 19:16) (100 - 100)  ABP: --  ABP(mean): --  RR: 17 (12 Oct 2021 05:06) (17 - 19)  SpO2: 98% (12 Oct 2021 05:06) (97% - 100%)    I&O's Summary    11 Oct 2021 07:01  -  12 Oct 2021 07:00  --------------------------------------------------------  IN: 0 mL / OUT: 1000 mL / NET: -1000 mL      --------------------------------------------------------------------------------------    EXAM:  General/Neuro  GCS:   Exam: Normal, NAD, alert, oriented x 3, no focal deficits. PERRLA  ***    Respiratory  Exam: Lungs clear to auscultation, Normal expansion/effort.  ***    Cardiovascular  Exam: S1, S2.  Regular rate and rhythm.  Peripheral edema  ***  Cardiac Rhythm: Normal Sinus Rhythm    GI  Exam: Abdomen soft, Non-tender, Non-distended.    Wound:   ***    Extremities  Exam: Extremities warm, pink, well-perfused.      Derm:  Exam: Good skin turgor, no skin breakdown.      LABS  --------------------------------------------------------------------------------------  Labs:  CAPILLARY BLOOD GLUCOSE                              15.7   16.67 )-----------( 320      ( 11 Oct 2021 18:50 )             44.1       Auto Neutrophil %: 87.8 % (10-11-21 @ 18:50)  Auto Immature Granulocyte %: 0.5 % (10-11-21 @ 18:50)    10-12    139  |  104  |  9   ----------------------------<  x   3.5   |  24  |  0.88      Calcium, Total Serum: 9.6 mg/dL (10-12-21 @ 08:49)      LFTs:             7.5  | 0.4  | 36       ------------------[69      ( 11 Oct 2021 17:05 )  4.3  | x    | 28          Lipase:x      Amylase:x             Coags:     12.8   ----< 1.07    ( 11 Oct 2021 17:05 )     31.6                Urinalysis Basic - ( 12 Oct 2021 00:25 )    Color: Yellow / Appearance: Clear / S.020 / pH: x  Gluc: x / Ketone: NEGATIVE  / Bili: Negative / Urobili: 0.2 E.U./dL   Blood: x / Protein: NEGATIVE mg/dL / Nitrite: NEGATIVE   Leuk Esterase: NEGATIVE / RBC: x / WBC x   Sq Epi: x / Non Sq Epi: x / Bacteria: x          --------------------------------------------------------------------------------------    OTHER LABS    IMAGING RESULTS  ****************

## 2021-10-12 NOTE — DISCHARGE NOTE PROVIDER - CARE PROVIDER_API CALL
Pérez Lan)  Orthopaedic Surgery  [address]  [place], [place] [address]  Phone: [phone]  Fax: [phone]  Follow Up Time: 2 weeks   GRETCHEN MARIE, SURINDER PRINGLE  Orthopedic Surgery  Phone: [phone]  Fax: [phone]  Follow Up Time: Routine   Pérez Lan)  Orthopaedic Surgery  [address]  [place], [place] [address]  Phone: [phone]  Fax: [phone]  Follow Up Time: 2 weeks

## 2021-10-12 NOTE — DISCHARGE NOTE PROVIDER - NSDCCPTREATMENT_GEN_ALL_CORE_FT
PRINCIPAL PROCEDURE  Procedure: Open reduction and internal fixation of distal femur  Findings and Treatment:

## 2021-10-12 NOTE — DISCHARGE NOTE PROVIDER - NSDCCPCAREPLAN_GEN_ALL_CORE_FT
PRINCIPAL DISCHARGE DIAGNOSIS  Diagnosis: Dislocation of left patella  Assessment and Plan of Treatment:       SECONDARY DISCHARGE DIAGNOSES  Diagnosis: Fracture, femur, distal  Assessment and Plan of Treatment:

## 2021-10-12 NOTE — DISCHARGE NOTE PROVIDER - PROVIDER TOKENS
PROVIDER:[TOKEN:[[id number]],FOLLOWUP:[2 weeks]] PROVIDER:[TOKEN:[[id number]],FOLLOWUP:[Routine]]

## 2021-10-12 NOTE — PROGRESS NOTE ADULT - ATTENDING SUPERVISION STATEMENT
Order was written/H&P was completed/Contractions pattern was reviewed/FHR was reviewed/Induction / Augmentation was discussed
Resident

## 2021-10-12 NOTE — PROGRESS NOTE ADULT - SUBJECTIVE AND OBJECTIVE BOX
Ortho Note    Pt seen and examined. Comfortable without complaints, pain controlled  Denies CP, SOB, N/V, numbness/tingling. Pt with left leg KI. Seen by Dr. Lan this AM, with plans for  surgery Friday after swelling improves.    Vital Signs Last 24 Hrs  T(C): 37 (10-12-21 @ 09:38), Max: 37 (10-12-21 @ 09:38)  T(F): 98.6 (10-12-21 @ 09:38), Max: 98.6 (10-12-21 @ 09:38)  HR: 98 (10-12-21 @ 09:38) (98 - 98)  BP: 113/76 (10-12-21 @ 09:38) (113/76 - 113/76)  BP(mean): --  RR: 18 (10-12-21 @ 09:38) (18 - 18)  SpO2: 95% (10-12-21 @ 09:38) (95% - 95%)  AVSS    General: Pt Alert and oriented, NAD  swelling to L knee; in KI; no erythema   Pulses: +DP, WWP toes  Sensation: SILT BLE  Motor: 5/5 EHL/FHL/TA/GS                          15.9   13.30 )-----------( 300      ( 12 Oct 2021 11:31 )             46.4     10-12    139  |  104  |  9   ----------------------------<  113<H>  3.5   |  24  |  0.88    Ca    9.6      12 Oct 2021 08:49    TPro  7.5  /  Alb  4.3  /  TBili  0.4  /  DBili  x   /  AST  36  /  ALT  28  /  AlkPhos  69  10-11      A/P: 46yMales/p L patella dislocation and posterior condyle fx  - medicine for pre-op clearance  - Pain Control  - DVT ppx: HSQ; will hold for OR on AM of 10/15   - PT, WBS: NWB LLE in KI; gait training  - I/S, bowel regimen  - dispo: OR Friday 10/15 with Dr. Lan    Ortho Pager [phone]

## 2021-10-12 NOTE — DISCHARGE NOTE PROVIDER - NSDCMRMEDTOKEN_GEN_ALL_CORE_FT
acetaminophen 325 mg oral tablet: 2 tab(s) orally every 6 hours, As needed, Temp greater or equal to 38C (100.4F), Mild Pain (1 - 3)  enoxaparin 40 mg/0.4 mL injectable solution: 40 milligram(s) subcutaneously once a day   oxyCODONE 5 mg oral tablet: 1 tab(s) orally every 6 hours, as needed for moderate to severe pain MDD:4  sertraline 50 mg oral tablet: 1 tab(s) orally once a day   acetaminophen 325 mg oral tablet: 2 tab(s) orally every 6 hours, As needed, Temp greater or equal to 38C (100.4F), Mild Pain (1 - 3)  sertraline 50 mg oral tablet: 1 tab(s) orally once a day   acetaminophen 325 mg oral tablet: 2 tab(s) orally every 6 hours, As needed, Temp greater or equal to 38C (100.4F), Mild Pain (1 - 3)  Aspirin Enteric Coated 325 mg oral delayed release tablet: 1 tab(s) orally once a day MDD:1  oxyCODONE 5 mg oral tablet: 1-2  tab(s) orally every 4 - 6 hours, As needed, Moderate to Severe pain MDD:10  sertraline 50 mg oral tablet: 1 tab(s) orally once a day

## 2021-10-12 NOTE — PROGRESS NOTE ADULT - SUBJECTIVE AND OBJECTIVE BOX
SUBJECTIVE/OBJECTIVE:     Patient seen and examined.   Patient supine in bed in NAD      Vital Signs Last 24 Hrs  T(C): 37 (12 Oct 2021 09:38), Max: 37.1 (11 Oct 2021 19:16)  T(F): 98.6 (12 Oct 2021 09:38), Max: 98.7 (11 Oct 2021 19:16)  HR: 98 (12 Oct 2021 09:38) (69 - 110)  BP: 113/76 (12 Oct 2021 09:38) (111/55 - 143/84)  BP(mean): 100 (11 Oct 2021 19:16) (100 - 100)  RR: 18 (12 Oct 2021 09:38) (17 - 19)  SpO2: 95% (12 Oct 2021 09:38) (95% - 100%)    Affected extremity:           -Left LE    Immobilizer intact  Well aligned  Skin intact  Moderate left knee effusion  Calf Soft Non tender  No leg pain with AROM or PROM toes      LABS:                        15.7   16.67 )-----------( 320      ( 11 Oct 2021 18:50 )             44.1     10-12    139  |  104  |  9   ----------------------------<  113<H>  3.5   |  24  |  0.88    Ca    9.6      12 Oct 2021 08:49    TPro  7.5  /  Alb  4.3  /  TBili  0.4  /  DBili  x   /  AST  36  /  ALT  28  /  AlkPhos  69  10-11    PT/INR - ( 11 Oct 2021 17:05 )   PT: 12.8 sec;   INR: 1.07          PTT - ( 11 Oct 2021 17:05 )  PTT:31.6 sec  Urinalysis Basic - ( 12 Oct 2021 00:25 )    Color: Yellow / Appearance: Clear / S.020 / pH: x  Gluc: x / Ketone: NEGATIVE  / Bili: Negative / Urobili: 0.2 E.U./dL   Blood: x / Protein: NEGATIVE mg/dL / Nitrite: NEGATIVE   Leuk Esterase: NEGATIVE / RBC: x / WBC x   Sq Epi: x / Non Sq Epi: x / Bacteria: x        MEDICATIONS:    Anticoagulation:  heparin   Injectable 5000 Unit(s) SubCutaneous every 8 hours    Antibiotics:     Pain medications:   acetaminophen   Tablet .. 650 milliGRAM(s) Oral every 6 hours PRN  HYDROmorphone  Injectable 0.5 milliGRAM(s) IV Push every 4 hours PRN  melatonin 5 milliGRAM(s) Oral at bedtime PRN  oxyCODONE    IR 10 milliGRAM(s) Oral every 4 hours PRN  oxyCODONE    IR 5 milliGRAM(s) Oral every 4 hours PRN      RADIOLOGY & ADDITIONAL STUDIES:    CT scan of left knee reviewed   Left lateral condyle fracture displaced      A/P :     47 yo old male left displaced lateral condyle Hoffa fracture/ patellar dislocation 10.11.21 injury doing lunges at gym    Friday 10.15.21 ORIF with cannulated screws +/- small plate  NWB/immobilzer  DVT ppx with subQ hep

## 2021-10-12 NOTE — CONSULT NOTE ADULT - ASSESSMENT
46M w h/o HLD not on medications, Anxiety, Obesity (32) p/w L knee pain after injury at the gym found to have patellar dislocation and LFC fracture - for OR w Dr. Lan - co-management called for pre-op evaluation    #Pre-op evaluation  EKG: pending  RCRI: Class I Risk  HUBBARD: 0.0% risk of MI or cardiac arrest intraop or up to 30d post-op  METS: >>4    #L patellar dislocation w LFC fracture - management per orthopedics  #HLD - not on medication - fish oil supplements  #Anxiety - chronic, at baseline. c/w home sertraline 50mg    Recommendations  -Patient is low risk for intermediate risk procedure. Optimized for OR  -EKG today  -Restart sertraline 50mg   -Can hold ozempic at this time  -Continue VTE ppx - SQL. Please restart post-op as soon as post-op bleeding risk allows    PMD: Dr. Camilo Fernandez - [facility]  DISPO: TBD pending OR, post-op PT. Pt lives in elevator building - 1 step

## 2021-10-12 NOTE — PROGRESS NOTE ADULT - ATTENDING COMMENTS
Pt. seen/ examined w/ Dr. Lan  Labs/ vitals/ XR/ CT reviewed  No additional injuries  Ortho care per Dr. Lan

## 2021-10-12 NOTE — DISCHARGE NOTE PROVIDER - NSDCACTIVITY_GEN_ALL_CORE
Do not drive or operate machinery/Walking - Indoors allowed/No heavy lifting/straining Do not drive or operate machinery/Walking - Indoors allowed/No heavy lifting/straining/Follow Instructions Provided by your Surgical Team

## 2021-10-12 NOTE — DISCHARGE NOTE PROVIDER - HOSPITAL COURSE
Admitted 10/11/21 with L patella dislocation and fracture  Medical Pre-op clearancea and optimization  Surgery  Teodora-op Antibiotics  Pain control  DVT prophylaxis- heparin subcutaneous pre-op  OOB/Physical Therapy   Admitted 10/11/21 with L patella dislocation and fracture  Medical Pre-op clearancea and optimization  Pain control  DVT prophylaxis- heparin subcutaneous pre-op  OOB/Physical Therapy- Non weight-bearing on left leg in knee immobilizer   Admitted 10/11/21 with L patella dislocation and fracture  Medical Pre-op clearancea and optimization  Pain control  Surgery- Distal femur ORIF 10/15/21  DVT prophylaxis- heparin subcutaneous pre-op  OOB/Physical Therapy- Non weight-bearing on left leg in knee immobilizer   Admitted 10/11/21 with L patella dislocation and fracture  Medical Pre-op clearance and optimization  Pain control  Surgery- Distal femur ORIF 10/15/21  DVT prophylaxis- heparin subcutaneous pre-op  OOB/Physical Therapy- Non weight-bearing on left leg in knee immobilizer   Admitted 10/11/21 with L patella dislocation and fracture  Medical Pre-op clearance and optimization  Pain control  Surgery- Distal femur ORIF 10/15/21  DVT prophylaxis- heparin subcutaneous pre-op, Aspirin 325 Daily upon discharge   OOB/Physical Therapy- Non weight-bearing on left leg in Prosper brace locked in extension

## 2021-10-12 NOTE — PROGRESS NOTE ADULT - SUBJECTIVE AND OBJECTIVE BOX
SUBJECTIVE: Pt seen and examined on morning rounds. Pt feeling well without complaints. Pain controlled at rest. Comfortable in KI. Denies numbness/tingling.    Vital Signs Last 24 Hrs  T(C): 36.9 (12 Oct 2021 05:06), Max: 37.1 (11 Oct 2021 19:16)  T(F): 98.5 (12 Oct 2021 05:06), Max: 98.7 (11 Oct 2021 19:16)  HR: 96 (12 Oct 2021 05:06) (69 - 110)  BP: 128/82 (12 Oct 2021 05:06) (111/55 - 143/84)  BP(mean): 100 (11 Oct 2021 19:16) (100 - 100)  RR: 17 (12 Oct 2021 05:06) (17 - 19)  SpO2: 98% (12 Oct 2021 05:06) (97% - 100%)    Physical Exam:  General: NAD, resting comfortably in bed  R Knee: In extension in KI  Mild/moderate effusion palpable  Mild TTP over medial aspect of knee  Firing quad/psoas, TA/GS/EHL 5/5  SILT grossly SPN/DPN/Saph/Kayleigh/Tib  DP 2+    Assessment/Plan:  46yM s/p L Patellar dislocation and posterolateral femoral condyle fx pending OR with Dr. Lan/Thea  - Will obtain medical clearance today for OR  - OR date pending Dr. Lan plan  - Weight Bearing Status: NWB in KI  - Pain control  - DVT PPx: HSQ  - PT rec: Pending OR  - F/u AM labs    Twila Ashraf, PGY-2  Orthopedic Surgery

## 2021-10-13 LAB
ANION GAP SERPL CALC-SCNC: 8 MMOL/L — SIGNIFICANT CHANGE UP (ref 5–17)
BUN SERPL-MCNC: 16 MG/DL — SIGNIFICANT CHANGE UP (ref 7–23)
CALCIUM SERPL-MCNC: 9.6 MG/DL — SIGNIFICANT CHANGE UP (ref 8.4–10.5)
CHLORIDE SERPL-SCNC: 102 MMOL/L — SIGNIFICANT CHANGE UP (ref 96–108)
CO2 SERPL-SCNC: 27 MMOL/L — SIGNIFICANT CHANGE UP (ref 22–31)
CREAT SERPL-MCNC: 1.11 MG/DL — SIGNIFICANT CHANGE UP (ref 0.5–1.3)
GLUCOSE SERPL-MCNC: 97 MG/DL — SIGNIFICANT CHANGE UP (ref 70–99)
HCT VFR BLD CALC: 46.1 % — SIGNIFICANT CHANGE UP (ref 39–50)
HGB BLD-MCNC: 15.8 G/DL — SIGNIFICANT CHANGE UP (ref 13–17)
MCHC RBC-ENTMCNC: 29.6 PG — SIGNIFICANT CHANGE UP (ref 27–34)
MCHC RBC-ENTMCNC: 34.3 GM/DL — SIGNIFICANT CHANGE UP (ref 32–36)
MCV RBC AUTO: 86.5 FL — SIGNIFICANT CHANGE UP (ref 80–100)
NRBC # BLD: 0 /100 WBCS — SIGNIFICANT CHANGE UP (ref 0–0)
PLATELET # BLD AUTO: 304 K/UL — SIGNIFICANT CHANGE UP (ref 150–400)
POTASSIUM SERPL-MCNC: 3.9 MMOL/L — SIGNIFICANT CHANGE UP (ref 3.5–5.3)
POTASSIUM SERPL-SCNC: 3.9 MMOL/L — SIGNIFICANT CHANGE UP (ref 3.5–5.3)
RBC # BLD: 5.33 M/UL — SIGNIFICANT CHANGE UP (ref 4.2–5.8)
RBC # FLD: 12.3 % — SIGNIFICANT CHANGE UP (ref 10.3–14.5)
SODIUM SERPL-SCNC: 137 MMOL/L — SIGNIFICANT CHANGE UP (ref 135–145)
WBC # BLD: 13.14 K/UL — HIGH (ref 3.8–10.5)
WBC # FLD AUTO: 13.14 K/UL — HIGH (ref 3.8–10.5)

## 2021-10-13 PROCEDURE — 99233 SBSQ HOSP IP/OBS HIGH 50: CPT

## 2021-10-13 RX ORDER — BENZOCAINE AND MENTHOL 5; 1 G/100ML; G/100ML
1 LIQUID ORAL EVERY 4 HOURS
Refills: 0 | Status: DISCONTINUED | OUTPATIENT
Start: 2021-10-13 | End: 2021-10-13

## 2021-10-13 RX ORDER — DIPHENHYDRAMINE HCL 50 MG
50 CAPSULE ORAL AT BEDTIME
Refills: 0 | Status: DISCONTINUED | OUTPATIENT
Start: 2021-10-13 | End: 2021-10-15

## 2021-10-13 RX ORDER — ENOXAPARIN SODIUM 100 MG/ML
40 INJECTION SUBCUTANEOUS
Qty: 7 | Refills: 0
Start: 2021-10-13 | End: 2021-10-19

## 2021-10-13 RX ORDER — SERTRALINE 25 MG/1
1 TABLET, FILM COATED ORAL
Qty: 0 | Refills: 0 | DISCHARGE
Start: 2021-10-13

## 2021-10-13 RX ORDER — OXYCODONE HYDROCHLORIDE 5 MG/1
1 TABLET ORAL
Qty: 20 | Refills: 0
Start: 2021-10-13 | End: 2021-10-17

## 2021-10-13 RX ORDER — ACETAMINOPHEN 500 MG
2 TABLET ORAL
Qty: 0 | Refills: 0 | DISCHARGE
Start: 2021-10-13

## 2021-10-13 RX ORDER — BENZOCAINE AND MENTHOL 5; 1 G/100ML; G/100ML
1 LIQUID ORAL EVERY 4 HOURS
Refills: 0 | Status: DISCONTINUED | OUTPATIENT
Start: 2021-10-13 | End: 2021-10-15

## 2021-10-13 RX ADMIN — POLYETHYLENE GLYCOL 3350 17 GRAM(S): 17 POWDER, FOR SOLUTION ORAL at 14:37

## 2021-10-13 RX ADMIN — SENNA PLUS 2 TABLET(S): 8.6 TABLET ORAL at 22:11

## 2021-10-13 RX ADMIN — HEPARIN SODIUM 5000 UNIT(S): 5000 INJECTION INTRAVENOUS; SUBCUTANEOUS at 05:06

## 2021-10-13 RX ADMIN — HEPARIN SODIUM 5000 UNIT(S): 5000 INJECTION INTRAVENOUS; SUBCUTANEOUS at 22:16

## 2021-10-13 RX ADMIN — Medication 5000 UNIT(S): at 11:57

## 2021-10-13 RX ADMIN — Medication 50 MILLIGRAM(S): at 22:11

## 2021-10-13 RX ADMIN — HEPARIN SODIUM 5000 UNIT(S): 5000 INJECTION INTRAVENOUS; SUBCUTANEOUS at 14:37

## 2021-10-13 RX ADMIN — SERTRALINE 50 MILLIGRAM(S): 25 TABLET, FILM COATED ORAL at 11:58

## 2021-10-13 NOTE — PROGRESS NOTE ADULT - SUBJECTIVE AND OBJECTIVE BOX
SUBJECTIVE: Pt seen and examined on morning rounds. Pt feeling well without complaints. Pain controlled at rest. Comfortable in KI. Denies numbness/tingling.    Vital Signs Last 24 Hrs  T(C): 37.1 (13 Oct 2021 05:26), Max: 37.3 (12 Oct 2021 21:15)  T(F): 98.7 (13 Oct 2021 05:26), Max: 99.1 (12 Oct 2021 21:15)  HR: 92 (13 Oct 2021 05:26) (92 - 106)  BP: 117/76 (13 Oct 2021 05:26) (113/76 - 131/68)  BP(mean): --  RR: 18 (13 Oct 2021 05:26) (17 - 18)  SpO2: 96% (13 Oct 2021 05:26) (94% - 98%)    Physical Exam:  General: NAD, resting comfortably in bed  R Knee: In extension in KI  Mild/moderate effusion palpable  Mild TTP over medial aspect of knee  Firing quad/psoas, TA/GS/EHL 5/5  SILT grossly SPN/DPN/Saph/Kayleigh/Tib  DP 2+    Assessment/Plan:  46yM s/p L Patellar dislocation and posterolateral femoral condyle fx for OR on Friday 10/15 with Dr. Lan  - Medically cleared by Dr. Gan on 10/12  - For ORIF with Dr. Lan on 10/15 Friday  - Weight Bearing Status: NWB in KI  - Pain control  - DVT PPx: HSQ, hold thursday 10/14 PM  - PT rec: NWB gait training in interim  - Preop on Thursday, will require repeat COVID  - F/u AM labs    Twila Ashraf, PGY-2  Orthopedic Surgery

## 2021-10-13 NOTE — PROGRESS NOTE ADULT - SUBJECTIVE AND OBJECTIVE BOX
Ortho Note    Pt comfortable without complaints, pain controlled  Denies CP, SOB, N/V, numbness/tingling     Vital Signs Last 24 Hrs  T(C): 36.9 (10-13-21 @ 09:54), Max: 37.1 (10-13-21 @ 05:26)  T(F): 98.4 (10-13-21 @ 09:54), Max: 98.7 (10-13-21 @ 05:26)  HR: 92 (10-13-21 @ 09:54) (92 - 92)  BP: 122/79 (10-13-21 @ 09:54) (117/76 - 122/79)  BP(mean): --  RR: 18 (10-13-21 @ 09:54) (18 - 18)  SpO2: 96% (10-13-21 @ 09:54) (96% - 96%)  AVSS    General: Pt Alert and oriented, NAD  swelling to L knee improving from yesterday; in KI; no erythema   Pulses: +DP, WWP toes  Sensation: SILT BLE  Motor: 5/5 EHL/FHL/TA/GS                        15.8   13.14 )-----------( 304      ( 13 Oct 2021 05:52 )             46.1     10-13    137  |  102  |  16  ----------------------------<  97  3.9   |  27  |  1.11    Ca    9.6      13 Oct 2021 05:52    TPro  7.5  /  Alb  4.3  /  TBili  0.4  /  DBili  x   /  AST  36  /  ALT  28  /  AlkPhos  69  10-11        A/P: 46yMales/p L patella dislocation and posterior condyle fx  - medicine for pre-op clearance  - Pain Control  - DVT ppx: HSQ; will hold for OR on AM of 10/15   - PT, WBS: NWB LLE in KI; gait training  - I/S, bowel regimen  - dispo: OR Friday 10/15 with Dr. Lan    Ortho Pager [phone]

## 2021-10-13 NOTE — PROGRESS NOTE ADULT - SUBJECTIVE AND OBJECTIVE BOX
INTERVAL HPI/OVERNIGHT EVENTS: FAROOQ O/N    SUBJECTIVE: Patient seen and examined at bedside.   Pt states pain is controlled. Denies any numbness. States he had a bit of sore throat but this has resolved. Denies any fever, chest pain, dyspnea. Eating well wo N/V/Abd pain. +flatus wo BM. Voiding wo dysuria.     OBJECTIVE:    VITAL SIGNS:  ICU Vital Signs Last 24 Hrs  T(C): 37.5 (13 Oct 2021 18:42), Max: 37.5 (13 Oct 2021 18:42)  T(F): 99.5 (13 Oct 2021 18:42), Max: 99.5 (13 Oct 2021 18:42)  HR: 98 (13 Oct 2021 18:42) (92 - 102)  BP: 124/79 (13 Oct 2021 18:42) (117/76 - 131/68)  BP(mean): --  ABP: --  ABP(mean): --  RR: 18 (13 Oct 2021 18:42) (17 - 18)  SpO2: 96% (13 Oct 2021 18:42) (94% - 98%)        10-12 @ :01  -  10-13 @ 07:00  --------------------------------------------------------  IN: 0 mL / OUT: 1150 mL / NET: -1150 mL    10-13 @ 07:01  -  10-13 @ 20:02  --------------------------------------------------------  IN: 0 mL / OUT: 1500 mL / NET: -1500 mL      CAPILLARY BLOOD GLUCOSE          PHYSICAL EXAM:  GEN: Male in NAD on RA  HEENT: NC/AT, MMM  NECK: Supple  CV: RRR, nml S1S2, no murmurs  PULM: nml effort, CTAB wo rales, rhonchi, or wheezing  ABD: Soft, non-distended, NABS, non-tender  NEURO  A/O x3, moving all extremities  L knee in splint. Plantarflex/ext 5/5 in bilateral feet. Sensation intact in L foot.   PSYCH: Appropriate    MEDICATIONS:  MEDICATIONS  (STANDING):  cholecalciferol 5000 Unit(s) Oral daily  heparin   Injectable 5000 Unit(s) SubCutaneous every 8 hours  influenza   Vaccine 0.5 milliLiter(s) IntraMuscular once  polyethylene glycol 3350 17 Gram(s) Oral daily  senna 2 Tablet(s) Oral at bedtime  sertraline 50 milliGRAM(s) Oral daily    MEDICATIONS  (PRN):  acetaminophen   Tablet .. 650 milliGRAM(s) Oral every 6 hours PRN Temp greater or equal to 38C (100.4F), Mild Pain (1 - 3)  benzocaine 15 mG/menthol 3.6 mG Lozenge 1 Lozenge Oral every 4 hours PRN Sore Throat  diphenhydrAMINE 50 milliGRAM(s) Oral at bedtime PRN Insomnia  HYDROmorphone  Injectable 0.5 milliGRAM(s) IV Push every 4 hours PRN breakthrough pain  magnesium hydroxide Suspension 30 milliLiter(s) Oral daily PRN Constipation  melatonin 5 milliGRAM(s) Oral at bedtime PRN Sleep  oxyCODONE    IR 10 milliGRAM(s) Oral every 4 hours PRN Severe Pain (7 - 10)  oxyCODONE    IR 5 milliGRAM(s) Oral every 4 hours PRN Moderate Pain (4 - 6)      ALLERGIES:  Allergies    No Known Allergies    Intolerances        LABS:                        15.8   13.14 )-----------( 304      ( 13 Oct 2021 05:52 )             46.1     10-13    137  |  102  |  16  ----------------------------<  97  3.9   |  27  |  1.11    Ca    9.6      13 Oct 2021 05:52        Urinalysis Basic - ( 12 Oct 2021 00:25 )    Color: Yellow / Appearance: Clear / S.020 / pH: x  Gluc: x / Ketone: NEGATIVE  / Bili: Negative / Urobili: 0.2 E.U./dL   Blood: x / Protein: NEGATIVE mg/dL / Nitrite: NEGATIVE   Leuk Esterase: NEGATIVE / RBC: x / WBC x   Sq Epi: x / Non Sq Epi: x / Bacteria: x        RADIOLOGY & ADDITIONAL TESTS: Reviewed.

## 2021-10-13 NOTE — PHYSICAL THERAPY INITIAL EVALUATION ADULT - DISCHARGE DISPOSITION, PT EVAL
home/no skilled PT needs DISCHARGE INPATIENT PT secondary to patient mod independent with rolling walker/home/no skilled PT needs

## 2021-10-13 NOTE — PROGRESS NOTE ADULT - ASSESSMENT
46M w h/o HLD not on medications, Anxiety, Obesity (32) p/w L knee pain after injury at the gym found to have patellar dislocation and LFC fracture - for OR w Dr. Lan - co-management called for pre-op evaluation    #Pre-op evaluation  EKG: pending  RCRI: Class I Risk  HUBBARD: 0.0% risk of MI or cardiac arrest intraop or up to 30d post-op  METS: >>4    #L patellar dislocation w LFC fracture - management per orthopedics  #HLD - not on medication - fish oil supplements  #Anxiety - chronic, at baseline. c/w home sertraline 50mg  #Leukocytosis - 13 - likely reactive. Afebrile and non-toxic appearing. Monitor clinically    Recommendations  Overall doing well. Awaiting swelling to subside prior to surgery. Pt wishes to get up and sit in a chair.  -Patient is low risk for intermediate risk procedure. Optimized for OR  -Can hold ozempic at this time  -Continue VTE ppx - SQH. Please restart post-op as soon as post-op bleeding risk allows    PMD: Dr. Camilo Fernandez - [facility]  DISPO: TBD pending OR, post-op PT. Pt lives in elevator building - 1 step

## 2021-10-13 NOTE — PHYSICAL THERAPY INITIAL EVALUATION ADULT - PERTINENT HX OF CURRENT PROBLEM, REHAB EVAL
46M history of high cholesterol and anxiety presenting with left knee pain s/p injury at the gym. The patient states he was doing "jumping lunges" at the gym and landed wrong, falling to the floor with immediate left knee pain and inability to bear weight. Was subsequently taken to the ER by ambulance.

## 2021-10-13 NOTE — PROGRESS NOTE ADULT - SUBJECTIVE AND OBJECTIVE BOX
SUBJECTIVE/OBJECTIVE:    Patient seen and examined.   Patient supine in bed NAD     Vital Signs Last 24 Hrs    T(F): 98.4 (13 Oct 2021 09:54), Max: 99.1 (12 Oct 2021 21:15)  HR: 92 (13 Oct 2021 09:54) (92 - 106)  BP: 122/79 (13 Oct 2021 09:54) (117/76 - 131/68)  SpO2: 96% (13 Oct 2021 09:54) (94% - 98%)    Affected extremity:           -Left LE  Immobilizer intact  Calf soft  No pain with toe active or passive ROM  Swelling distal tibia improved                        15.8   13.14 )-----------( 304      ( 13 Oct 2021 05:52 )             46.1     137  |  102  |  16  ----------------------------<  97  3.9   |  27  |  1.11    MEDICATIONS:      RADIOLOGY & ADDITIONAL STUDIES:    A/P :      Plan OR Friday 10.15  ORIF Left lateral condyle fracture  DVT ppx SQH  Pain control  NWB/immobilizer

## 2021-10-14 ENCOUNTER — TRANSCRIPTION ENCOUNTER (OUTPATIENT)
Age: 46
End: 2021-10-14

## 2021-10-14 LAB
ANION GAP SERPL CALC-SCNC: 11 MMOL/L — SIGNIFICANT CHANGE UP (ref 5–17)
BLD GP AB SCN SERPL QL: NEGATIVE — SIGNIFICANT CHANGE UP
BUN SERPL-MCNC: 13 MG/DL — SIGNIFICANT CHANGE UP (ref 7–23)
CALCIUM SERPL-MCNC: 9.6 MG/DL — SIGNIFICANT CHANGE UP (ref 8.4–10.5)
CHLORIDE SERPL-SCNC: 102 MMOL/L — SIGNIFICANT CHANGE UP (ref 96–108)
CO2 SERPL-SCNC: 27 MMOL/L — SIGNIFICANT CHANGE UP (ref 22–31)
CREAT SERPL-MCNC: 1.2 MG/DL — SIGNIFICANT CHANGE UP (ref 0.5–1.3)
GLUCOSE SERPL-MCNC: 93 MG/DL — SIGNIFICANT CHANGE UP (ref 70–99)
HCT VFR BLD CALC: 45.9 % — SIGNIFICANT CHANGE UP (ref 39–50)
HGB BLD-MCNC: 15.3 G/DL — SIGNIFICANT CHANGE UP (ref 13–17)
HPIV3 RNA SPEC QL NAA+PROBE: DETECTED
MCHC RBC-ENTMCNC: 29.1 PG — SIGNIFICANT CHANGE UP (ref 27–34)
MCHC RBC-ENTMCNC: 33.3 GM/DL — SIGNIFICANT CHANGE UP (ref 32–36)
MCV RBC AUTO: 87.3 FL — SIGNIFICANT CHANGE UP (ref 80–100)
NRBC # BLD: 0 /100 WBCS — SIGNIFICANT CHANGE UP (ref 0–0)
PLATELET # BLD AUTO: 277 K/UL — SIGNIFICANT CHANGE UP (ref 150–400)
POTASSIUM SERPL-MCNC: 4.2 MMOL/L — SIGNIFICANT CHANGE UP (ref 3.5–5.3)
POTASSIUM SERPL-SCNC: 4.2 MMOL/L — SIGNIFICANT CHANGE UP (ref 3.5–5.3)
RAPID RVP RESULT: DETECTED
RBC # BLD: 5.26 M/UL — SIGNIFICANT CHANGE UP (ref 4.2–5.8)
RBC # FLD: 12.3 % — SIGNIFICANT CHANGE UP (ref 10.3–14.5)
RH IG SCN BLD-IMP: POSITIVE — SIGNIFICANT CHANGE UP
SARS-COV-2 RNA SPEC QL NAA+PROBE: SIGNIFICANT CHANGE UP
SARS-COV-2 RNA SPEC QL NAA+PROBE: SIGNIFICANT CHANGE UP
SODIUM SERPL-SCNC: 140 MMOL/L — SIGNIFICANT CHANGE UP (ref 135–145)
WBC # BLD: 10.14 K/UL — SIGNIFICANT CHANGE UP (ref 3.8–10.5)
WBC # FLD AUTO: 10.14 K/UL — SIGNIFICANT CHANGE UP (ref 3.8–10.5)

## 2021-10-14 PROCEDURE — 99233 SBSQ HOSP IP/OBS HIGH 50: CPT

## 2021-10-14 RX ORDER — POVIDONE-IODINE 5 %
1 AEROSOL (ML) TOPICAL ONCE
Refills: 0 | Status: COMPLETED | OUTPATIENT
Start: 2021-10-15 | End: 2021-10-15

## 2021-10-14 RX ORDER — CHLORHEXIDINE GLUCONATE 213 G/1000ML
1 SOLUTION TOPICAL ONCE
Refills: 0 | Status: COMPLETED | OUTPATIENT
Start: 2021-10-15 | End: 2021-10-15

## 2021-10-14 RX ORDER — MULTIVIT WITH MIN/MFOLATE/K2 340-15/3 G
1 POWDER (GRAM) ORAL ONCE
Refills: 0 | Status: COMPLETED | OUTPATIENT
Start: 2021-10-14 | End: 2021-10-14

## 2021-10-14 RX ADMIN — SERTRALINE 50 MILLIGRAM(S): 25 TABLET, FILM COATED ORAL at 11:27

## 2021-10-14 RX ADMIN — Medication 1 BOTTLE: at 12:14

## 2021-10-14 RX ADMIN — HEPARIN SODIUM 5000 UNIT(S): 5000 INJECTION INTRAVENOUS; SUBCUTANEOUS at 06:25

## 2021-10-14 RX ADMIN — Medication 5000 UNIT(S): at 11:27

## 2021-10-14 RX ADMIN — HEPARIN SODIUM 5000 UNIT(S): 5000 INJECTION INTRAVENOUS; SUBCUTANEOUS at 13:46

## 2021-10-14 RX ADMIN — HEPARIN SODIUM 5000 UNIT(S): 5000 INJECTION INTRAVENOUS; SUBCUTANEOUS at 21:35

## 2021-10-14 NOTE — PROGRESS NOTE ADULT - ASSESSMENT
46M w h/o HLD not on medications, Anxiety, Obesity (32) p/w L knee pain after injury at the gym found to have patellar dislocation and LFC fracture - for OR w Dr. Lan - co-management called for pre-op evaluation    #Pre-op evaluation  EKG: pending  RCRI: Class I Risk  HUBBARD: 0.0% risk of MI or cardiac arrest intraop or up to 30d post-op  METS: >>4    #L patellar dislocation w LFC fracture - management per orthopedics  #HLD - not on medication - fish oil supplements  #Anxiety - chronic, at baseline. c/w home sertraline 50mg  #Leukocytosis -  improved 10 from 13 - likely reactive. Afebrile and non-toxic appearing. Monitor clinically  #URI sxs - see below    Recommendations  Overall doing well. Awaiting swelling to subside prior to surgery.   -Patient is low risk for intermediate risk procedure. Optimized for OR  -Can hold ozempic at this time  -Continue VTE ppx - SQH. Please restart post-op as soon as post-op bleeding risk allows    -In setting of rhinorrhea, sick contact - possible viral URI. Please add RVP. COVID PCR Negative.    PMD: Dr. Camilo Fernandez - [facility]  DISPO: TBD pending OR, post-op PT. Pt lives in elevator building - 1 step

## 2021-10-14 NOTE — PROGRESS NOTE ADULT - SUBJECTIVE AND OBJECTIVE BOX
SUBJECTIVE: Pt seen and examined on morning rounds. Pt feeling well without complaints. Decided to stay for surgery tomorrow. Pain controlled at rest. Comfortable in KI. Denies numbness/tingling.    Vital Signs Last 24 Hrs  T(C): 36.8 (14 Oct 2021 06:06), Max: 37.5 (13 Oct 2021 18:42)  T(F): 98.3 (14 Oct 2021 06:06), Max: 99.5 (13 Oct 2021 18:42)  HR: 88 (14 Oct 2021 06:06) (88 - 98)  BP: 113/74 (14 Oct 2021 06:06) (113/74 - 126/83)  BP(mean): --  RR: 17 (14 Oct 2021 06:06) (17 - 18)  SpO2: 98% (14 Oct 2021 06:06) (96% - 98%)    Physical Exam:  General: NAD, resting comfortably in bed  R Knee: In extension in KI  Mild/moderate effusion palpable  Mild TTP over medial aspect of knee  Firing quad/psoas, TA/GS/EHL 5/5  SILT grossly SPN/DPN/Saph/Kayleigh/Tib  DP 2+    Assessment/Plan:  46yM s/p L Patellar dislocation and posterolateral femoral condyle fx for OR on Friday 10/15 with Dr. Lan  - Medically cleared by Dr. Gan on 10/12  - For ORIF with Dr. Lan on 10/15 Friday  - Weight Bearing Status: NWB in KI  - Pain control  - DVT PPx: HSQ, hold thursday 10/14 PM  - PT rec: NWB gait training in interim  - Preop on today, will require repeat COVID  - F/u AM labs    Twila Ashraf, PGY-2  Orthopedic Surgery

## 2021-10-14 NOTE — PROGRESS NOTE ADULT - SUBJECTIVE AND OBJECTIVE BOX
Ortho Note    Pt comfortable without complaints, pain controlled  Denies CP, SOB, N/V, numbness/tingling. Pt requesting transfer to [facility] under Dr. Reaves yesterday,  but has since decided to remain at [facility] under the care of Dr. Lan for surgery tomorrow.     Vital Signs Last 24 Hrs  T(C): 36.8 (10-14-21 @ 09:27), Max: 36.8 (10-14-21 @ 06:06)  T(F): 98.2 (10-14-21 @ 09:27), Max: 98.3 (10-14-21 @ 06:06)  HR: 89 (10-14-21 @ 09:27) (88 - 89)  BP: 128/84 (10-14-21 @ 09:27) (113/74 - 128/84)  BP(mean): --  RR: 18 (10-14-21 @ 09:27) (17 - 18)  SpO2: 97% (10-14-21 @ 09:27) (97% - 98%)  AVSS    General: Pt Alert and oriented, NAD  swelling to L knee improving from yesterday; in KI; no erythema   Pulses: +DP, WWP toes  Sensation: SILT BLE  Motor: 5/5 EHL/FHL/TA/GS                          15.3   10.14 )-----------( 277      ( 14 Oct 2021 07:53 )             45.9     10-14    140  |  102  |  13  ----------------------------<  93  4.2   |  27  |  1.20    Ca    9.6      14 Oct 2021 07:53        A/P: 46yMales/p L patella dislocation and posterior condyle fx  - medicine for pre-op clearance  - Pain Control  - DVT ppx: HSQ; will hold for OR on AM of 10/15   - PT, WBS: NWB LLE in KI; gait training  - I/S, bowel regimen  - new covid negative; NPO/IVF at midnight  - dispo: OR Friday 10/15 with Dr. Lan    Ortho Pager [phone]

## 2021-10-14 NOTE — PROGRESS NOTE ADULT - SUBJECTIVE AND OBJECTIVE BOX
INTERVAL HPI/OVERNIGHT EVENTS: FAROOQ O/N    SUBJECTIVE: Patient seen and examined at bedside.   Pt reports pain is controlled. No numbness. Does report rhinorrhea and subjective fever wo sore throat, cough, dyspnea, change in smell/taste. No N/V/Abd pain. +Flatus wo BM. Voiding wo dysuria.     Reports sick contact - child was recently sick w cold    OBJECTIVE:    VITAL SIGNS:  ICU Vital Signs Last 24 Hrs  T(C): 36.6 (14 Oct 2021 14:24), Max: 37.5 (13 Oct 2021 18:42)  T(F): 97.9 (14 Oct 2021 14:24), Max: 99.5 (13 Oct 2021 18:42)  HR: 98 (14 Oct 2021 14:24) (88 - 98)  BP: 122/79 (14 Oct 2021 14:24) (113/74 - 128/84)  BP(mean): --  ABP: --  ABP(mean): --  RR: 18 (14 Oct 2021 14:24) (17 - 18)  SpO2: 97% (14 Oct 2021 14:24) (96% - 98%)        10-13 @ 07:01  -  10-14 @ 07:00  --------------------------------------------------------  IN: 0 mL / OUT: 2000 mL / NET: -2000 mL    10-14 @ 07:01  -  10-14 @ 16:51  --------------------------------------------------------  IN: 0 mL / OUT: 1050 mL / NET: -1050 mL      CAPILLARY BLOOD GLUCOSE          PHYSICAL EXAM:  GEN: Male in NAD on RA  HEENT: NC/AT, MMM  NECK: L submandibular DORIS present  CV: RRR, nml S1S2, no murmurs  PULM: nml effort, CTAB wo rales, rhonchi, or wheezing  ABD: Soft, non-distended, NABS, non-tender  NEURO  A/O x3, moving all extremities  L knee in splint. Plantarflex/ext 5/5 in bilateral feet. Sensation intact in L foot.   PSYCH: Appropriate      MEDICATIONS:  MEDICATIONS  (STANDING):  cholecalciferol 5000 Unit(s) Oral daily  heparin   Injectable 5000 Unit(s) SubCutaneous every 8 hours  influenza   Vaccine 0.5 milliLiter(s) IntraMuscular once  polyethylene glycol 3350 17 Gram(s) Oral daily  senna 2 Tablet(s) Oral at bedtime  sertraline 50 milliGRAM(s) Oral daily    MEDICATIONS  (PRN):  acetaminophen   Tablet .. 650 milliGRAM(s) Oral every 6 hours PRN Temp greater or equal to 38C (100.4F), Mild Pain (1 - 3)  benzocaine 15 mG/menthol 3.6 mG Lozenge 1 Lozenge Oral every 4 hours PRN Sore Throat  diphenhydrAMINE 50 milliGRAM(s) Oral at bedtime PRN Insomnia  HYDROmorphone  Injectable 0.5 milliGRAM(s) IV Push every 4 hours PRN breakthrough pain  magnesium hydroxide Suspension 30 milliLiter(s) Oral daily PRN Constipation  melatonin 5 milliGRAM(s) Oral at bedtime PRN Sleep  oxyCODONE    IR 10 milliGRAM(s) Oral every 4 hours PRN Severe Pain (7 - 10)  oxyCODONE    IR 5 milliGRAM(s) Oral every 4 hours PRN Moderate Pain (4 - 6)      ALLERGIES:  Allergies    No Known Allergies    Intolerances        LABS:                        15.3   10.14 )-----------( 277      ( 14 Oct 2021 07:53 )             45.9     10-14    140  |  102  |  13  ----------------------------<  93  4.2   |  27  |  1.20    Ca    9.6      14 Oct 2021 07:53            RADIOLOGY & ADDITIONAL TESTS: Reviewed.

## 2021-10-15 PROCEDURE — 73560 X-RAY EXAM OF KNEE 1 OR 2: CPT | Mod: 26,LT

## 2021-10-15 PROCEDURE — 99232 SBSQ HOSP IP/OBS MODERATE 35: CPT

## 2021-10-15 RX ORDER — POLYETHYLENE GLYCOL 3350 17 G/17G
17 POWDER, FOR SOLUTION ORAL DAILY
Refills: 0 | Status: DISCONTINUED | OUTPATIENT
Start: 2021-10-15 | End: 2021-10-18

## 2021-10-15 RX ORDER — MAGNESIUM HYDROXIDE 400 MG/1
30 TABLET, CHEWABLE ORAL DAILY
Refills: 0 | Status: DISCONTINUED | OUTPATIENT
Start: 2021-10-15 | End: 2021-10-18

## 2021-10-15 RX ORDER — SODIUM CHLORIDE 9 MG/ML
1000 INJECTION, SOLUTION INTRAVENOUS
Refills: 0 | Status: DISCONTINUED | OUTPATIENT
Start: 2021-10-15 | End: 2021-10-15

## 2021-10-15 RX ORDER — SODIUM CHLORIDE 9 MG/ML
1000 INJECTION, SOLUTION INTRAVENOUS
Refills: 0 | Status: DISCONTINUED | OUTPATIENT
Start: 2021-10-15 | End: 2021-10-18

## 2021-10-15 RX ORDER — CEFAZOLIN SODIUM 1 G
2000 VIAL (EA) INJECTION EVERY 8 HOURS
Refills: 0 | Status: COMPLETED | OUTPATIENT
Start: 2021-10-15 | End: 2021-10-16

## 2021-10-15 RX ORDER — SENNA PLUS 8.6 MG/1
2 TABLET ORAL AT BEDTIME
Refills: 0 | Status: DISCONTINUED | OUTPATIENT
Start: 2021-10-15 | End: 2021-10-18

## 2021-10-15 RX ORDER — SERTRALINE 25 MG/1
50 TABLET, FILM COATED ORAL DAILY
Refills: 0 | Status: DISCONTINUED | OUTPATIENT
Start: 2021-10-15 | End: 2021-10-18

## 2021-10-15 RX ORDER — OXYCODONE HYDROCHLORIDE 5 MG/1
5 TABLET ORAL EVERY 4 HOURS
Refills: 0 | Status: DISCONTINUED | OUTPATIENT
Start: 2021-10-15 | End: 2021-10-18

## 2021-10-15 RX ORDER — CHOLECALCIFEROL (VITAMIN D3) 125 MCG
5000 CAPSULE ORAL DAILY
Refills: 0 | Status: DISCONTINUED | OUTPATIENT
Start: 2021-10-15 | End: 2021-10-18

## 2021-10-15 RX ORDER — HYDROMORPHONE HYDROCHLORIDE 2 MG/ML
0.5 INJECTION INTRAMUSCULAR; INTRAVENOUS; SUBCUTANEOUS EVERY 4 HOURS
Refills: 0 | Status: DISCONTINUED | OUTPATIENT
Start: 2021-10-15 | End: 2021-10-18

## 2021-10-15 RX ORDER — MORPHINE SULFATE 50 MG/1
2 CAPSULE, EXTENDED RELEASE ORAL
Refills: 0 | Status: DISCONTINUED | OUTPATIENT
Start: 2021-10-15 | End: 2021-10-18

## 2021-10-15 RX ORDER — HEPARIN SODIUM 5000 [USP'U]/ML
5000 INJECTION INTRAVENOUS; SUBCUTANEOUS EVERY 8 HOURS
Refills: 0 | Status: COMPLETED | OUTPATIENT
Start: 2021-10-15 | End: 2021-10-17

## 2021-10-15 RX ORDER — DIPHENHYDRAMINE HCL 50 MG
50 CAPSULE ORAL AT BEDTIME
Refills: 0 | Status: DISCONTINUED | OUTPATIENT
Start: 2021-10-15 | End: 2021-10-18

## 2021-10-15 RX ORDER — ACETAMINOPHEN 500 MG
650 TABLET ORAL EVERY 6 HOURS
Refills: 0 | Status: DISCONTINUED | OUTPATIENT
Start: 2021-10-15 | End: 2021-10-18

## 2021-10-15 RX ORDER — OXYCODONE HYDROCHLORIDE 5 MG/1
10 TABLET ORAL EVERY 4 HOURS
Refills: 0 | Status: DISCONTINUED | OUTPATIENT
Start: 2021-10-15 | End: 2021-10-18

## 2021-10-15 RX ADMIN — OXYCODONE HYDROCHLORIDE 10 MILLIGRAM(S): 5 TABLET ORAL at 21:36

## 2021-10-15 RX ADMIN — HYDROMORPHONE HYDROCHLORIDE 0.5 MILLIGRAM(S): 2 INJECTION INTRAMUSCULAR; INTRAVENOUS; SUBCUTANEOUS at 14:15

## 2021-10-15 RX ADMIN — SODIUM CHLORIDE 140 MILLILITER(S): 9 INJECTION, SOLUTION INTRAVENOUS at 16:55

## 2021-10-15 RX ADMIN — Medication 5000 UNIT(S): at 18:33

## 2021-10-15 RX ADMIN — MORPHINE SULFATE 2 MILLIGRAM(S): 50 CAPSULE, EXTENDED RELEASE ORAL at 14:05

## 2021-10-15 RX ADMIN — HEPARIN SODIUM 5000 UNIT(S): 5000 INJECTION INTRAVENOUS; SUBCUTANEOUS at 21:35

## 2021-10-15 RX ADMIN — OXYCODONE HYDROCHLORIDE 10 MILLIGRAM(S): 5 TABLET ORAL at 22:30

## 2021-10-15 RX ADMIN — MORPHINE SULFATE 2 MILLIGRAM(S): 50 CAPSULE, EXTENDED RELEASE ORAL at 13:50

## 2021-10-15 RX ADMIN — SODIUM CHLORIDE 140 MILLILITER(S): 9 INJECTION, SOLUTION INTRAVENOUS at 00:19

## 2021-10-15 RX ADMIN — Medication 100 MILLIGRAM(S): at 19:43

## 2021-10-15 RX ADMIN — OXYCODONE HYDROCHLORIDE 10 MILLIGRAM(S): 5 TABLET ORAL at 14:05

## 2021-10-15 RX ADMIN — OXYCODONE HYDROCHLORIDE 10 MILLIGRAM(S): 5 TABLET ORAL at 14:16

## 2021-10-15 RX ADMIN — CHLORHEXIDINE GLUCONATE 1 APPLICATION(S): 213 SOLUTION TOPICAL at 06:06

## 2021-10-15 RX ADMIN — MORPHINE SULFATE 2 MILLIGRAM(S): 50 CAPSULE, EXTENDED RELEASE ORAL at 14:01

## 2021-10-15 RX ADMIN — SENNA PLUS 2 TABLET(S): 8.6 TABLET ORAL at 21:36

## 2021-10-15 RX ADMIN — SERTRALINE 50 MILLIGRAM(S): 25 TABLET, FILM COATED ORAL at 18:33

## 2021-10-15 RX ADMIN — Medication 1 APPLICATION(S): at 06:06

## 2021-10-15 RX ADMIN — HYDROMORPHONE HYDROCHLORIDE 0.5 MILLIGRAM(S): 2 INJECTION INTRAMUSCULAR; INTRAVENOUS; SUBCUTANEOUS at 14:26

## 2021-10-15 NOTE — BRIEF OPERATIVE NOTE - NSICDXBRIEFPROCEDURE_GEN_ALL_CORE_FT
PROCEDURES:  Open reduction and internal fixation of distal femur 15-Oct-2021 13:32:39  [facility] Bravo

## 2021-10-15 NOTE — PROGRESS NOTE ADULT - SUBJECTIVE AND OBJECTIVE BOX
SUBJECTIVE: Pt seen and examined on morning rounds. Pt feeling well without complaints. Eager for surgery today. Denies numbness/tingling.    Vital Signs Last 24 Hrs  T(C): 36.9 (15 Oct 2021 05:44), Max: 36.9 (15 Oct 2021 01:43)  T(F): 98.4 (15 Oct 2021 05:44), Max: 98.4 (15 Oct 2021 01:43)  HR: 89 (15 Oct 2021 05:44) (88 - 98)  BP: 116/74 (15 Oct 2021 05:44) (110/64 - 128/84)  BP(mean): --  RR: 18 (15 Oct 2021 05:44) (18 - 18)  SpO2: 96% (15 Oct 2021 05:44) (96% - 99%)    Physical Exam:  General: NAD, resting comfortably in bed  R Knee: In extension in KI  Mild/moderate effusion palpable  Mild TTP over medial aspect of knee  Firing quad/psoas, TA/GS/EHL 5/5  SILT grossly SPN/DPN/Saph/Kayleigh/Tib  DP 2+    Assessment/Plan:  46yM s/p L Patellar dislocation and posterolateral femoral condyle fx for OR on Friday 10/15 with Dr. Lan  - Medically cleared by Dr. Gan on 10/12  - For ORIF with Dr. Lan today  - Weight Bearing Status: NWB in KI  - Pain control  - DVT PPx: Holding HSQ for OR  - PT rec: NWB gait training in interim, repeat eval postop  - F/u AM labs    Twila Ashraf, PGY-2  Orthopedic Surgery

## 2021-10-15 NOTE — DIETITIAN INITIAL EVALUATION ADULT. - PROBLEM SELECTOR PLAN 1
-Discussed with Dr. Sidhu   -On admission to ED pt had left patella dislocation which was closed reduced in the ED, Frederick CARRANZA at bedside to assist. A knee immobilizer was then applied. Patient tolerated procedure well and remained neurovascularly intact post-reduction. After reduction, left knee non-tender to palpation. Post-reduction xrays/CT showing better alignment of patella.   -CT showing left femoral condyle fracture and left patellar pole fracture - plan to admit to orthopedic service for surgical intervention this week  -will consult medicine for co-management/medial optimization prior to OR; labs/EKG pending review by medical clearance   -pain control  -Non-weight bearing left lower extremity in knee immobilizer at all times - NO knee flexion  -pain control   -pt unsure of the doses of his home medications, states  will bring in medications this evening - will complete med rec once medications obtained  -surgical consent to be obtained once definitive surgical plan established    ADDENDUM: Patient's care being taken over by Dr. Lan

## 2021-10-15 NOTE — DIETITIAN INITIAL EVALUATION ADULT. - OTHER CALCULATIONS
IBW used to calculate energy needs due to pt's current body weight exceeding 120% of IBW (133%).  Needs calculated for age and protein increased for post-op healing.

## 2021-10-15 NOTE — PROGRESS NOTE ADULT - SUBJECTIVE AND OBJECTIVE BOX
INTERVAL HPI/OVERNIGHT EVENTS: FAROOQ O/N    SUBJECTIVE: Patient seen and examined at bedside.   Pt reports rhinorrhea is improved. No fever, chills. No cough or sore throat or dyspnea. Pain in leg is controlled. No N/V/Abd pain. +Flatus. Voiding wo dysuria.     Discussed results of +parainfluenza on RVP w patient    OBJECTIVE:    VITAL SIGNS:  ICU Vital Signs Last 24 Hrs  T(C): 36.5 (15 Oct 2021 08:55), Max: 36.9 (15 Oct 2021 01:43)  T(F): 97.7 (15 Oct 2021 08:55), Max: 98.4 (15 Oct 2021 01:43)  HR: 84 (15 Oct 2021 08:55) (84 - 98)  BP: 119/72 (15 Oct 2021 08:55) (110/64 - 124/81)  BP(mean): --  ABP: --  ABP(mean): --  RR: 16 (15 Oct 2021 08:55) (16 - 18)  SpO2: 95% (15 Oct 2021 08:55) (95% - 99%)        10-14 @ 07:01  -  10-15 @ 07:00  --------------------------------------------------------  IN: 1120 mL / OUT: 1750 mL / NET: -630 mL    10-15 @ 07:01  -  10-15 @ 13:08  --------------------------------------------------------  IN: 280 mL / OUT: 350 mL / NET: -70 mL      CAPILLARY BLOOD GLUCOSE          PHYSICAL EXAM:  GEN: Male in NAD on RA  HEENT: NC/AT, MMM  NECK: Supple  CV: RRR, nml S1S2, no murmurs  PULM: nml effort, CTAB wo rales, rhonchi, or wheezing  ABD: Soft, non-distended, NABS, non-tender  NEURO  A/O x3, moving all extremities  L knee in splint. Plantarflex/ext 5/5 in bilateral feet. Sensation intact in L foot.   PSYCH: Appropriate    MEDICATIONS:  MEDICATIONS  (STANDING):  influenza   Vaccine 0.5 milliLiter(s) IntraMuscular once    MEDICATIONS  (PRN):      ALLERGIES:  Allergies    No Known Allergies    Intolerances        LABS:                        15.3   10.14 )-----------( 277      ( 14 Oct 2021 07:53 )             45.9     10-14    140  |  102  |  13  ----------------------------<  93  4.2   |  27  |  1.20    Ca    9.6      14 Oct 2021 07:53            RADIOLOGY & ADDITIONAL TESTS: Reviewed.

## 2021-10-15 NOTE — BRIEF OPERATIVE NOTE - OPERATION/FINDINGS
op note; briefly L distal femur Hoffa type fracture of left posterior condyle and MPFL osseous disruption off the L patella now s/p ORIF of L hoffa fragment with repair of MPFL and medial tissue reefing

## 2021-10-15 NOTE — DIETITIAN INITIAL EVALUATION ADULT. - OTHER INFO
Pt with past medical history significant for high cholesterol and anxiety.  Pt presents with left knee pain s/p injury at the gym; left Patellar dislocation and posterolateral femoral condyle fracture.  Pt is planned for OR today for ORIF.    Pt remains in the OR upon  multiple attempts.  Nutrition assessment unable to be completed.    Pt is currently NPO after midnight for OR.  Previously ordered for Regular diet which was well-tolerated per documentation.

## 2021-10-15 NOTE — PROGRESS NOTE ADULT - SUBJECTIVE AND OBJECTIVE BOX
Ortho Post Op Check    Procedure: L distal femur ORIF  Surgeon: Dr. Lan    Pt comfortable without complaints, pain controlled. Denies CP, SOB, N/V, numbness/tingling     T(C): 36.2 (10-15-21 @ 13:20), Max: 36.9 (10-15-21 @ 01:43)  HR: 102 (10-15-21 @ 15:20) (80 - 102)  BP: 143/83 (10-15-21 @ 15:20) (110/64 - 143/83)  RR: 20 (10-15-21 @ 15:20) (9 - 22)  SpO2: 95% (10-15-21 @ 15:20) (95% - 99%)  AVSS    General: Pt Alert and oriented, NAD  DSG C/D/I, KI in place   Pulses: 2+ DP BLE   Sensation: SILT BLE   Motor: EHL/FHL/TA/GS 5/5 BLE     A/P: 46yMale POD#0 s/p L distal femur ORIF   - Stable  - Pain Control  - DVT ppx: SQH  - Post op abx: Ancef   - PT, WBS: NWB LLE in KI    Ortho Pager [phone]

## 2021-10-15 NOTE — PROGRESS NOTE ADULT - ASSESSMENT
46M w h/o HLD not on medications, Anxiety, Obesity (32) p/w L knee pain after injury at the gym found to have patellar dislocation and LFC fracture - for OR w Dr. Lan - co-management called for pre-op evaluation, found also to +parainfluenza on RVP in setting of URI sxs    #Pre-op evaluation  EKG: pending  RCRI: Class I Risk  HUBBARD: 0.0% risk of MI or cardiac arrest intraop or up to 30d post-op  METS: >>4    #L patellar dislocation w LFC fracture - management per orthopedics  #Parainfluenza - symptoms improving. Likely 2/2 sick contact from child  #HLD - not on medication - fish oil supplements  #Anxiety - chronic, at baseline. c/w home sertraline 50mg  #Leukocytosis -  improved 10 from 13 - likely reactive. Afebrile and non-toxic appearing. Monitor clinically    Recommendations  Overall doing well. For OR today  -Patient is low risk for intermediate risk procedure. Optimized for OR  -Symptomatic management for viral URI - consider PRN anti-histamines. Pt aware.  -Can hold ozempic at this time    Please VTE PPx post-op as soon as post-op bleeding risk allows    -In setting of rhinorrhea, sick contact - possible viral URI. Please add RVP. COVID PCR Negative.    PMD: Dr. Camilo Fernandez - [facility]  DISPO: TBD pending OR, post-op PT. Pt lives in elevator building - 1 step

## 2021-10-15 NOTE — PROGRESS NOTE ADULT - SUBJECTIVE AND OBJECTIVE BOX
Ortho Note    Pt comfortable without complaints, pain controlled.   Denies CP, SOB, N/V, numbness/tingling down le b/l.     Vital Signs Last 24 Hrs  T(C): 36.5 (10-15-21 @ 08:55), Max: 36.9 (10-15-21 @ 05:44)  T(F): 97.7 (10-15-21 @ 08:55), Max: 98.4 (10-15-21 @ 05:44)  HR: 84 (10-15-21 @ 08:55) (84 - 89)  BP: 119/72 (10-15-21 @ 08:55) (116/74 - 119/72)  BP(mean): --  RR: 16 (10-15-21 @ 08:55) (16 - 18)  SpO2: 95% (10-15-21 @ 08:55) (95% - 96%)      General: Pt Alert and oriented, NAD  Pulses: wwp, brisk cap refill   Sensation:  SILT throughout le b/l   Motor: EHL/FHL/TA/GS 5/5 b/l le    compartments soft, compressible, NTTP      A/P: 46yMale with left femur lateral condyle fracture preop for ORIF today with Dr. Lan    - Stable  - Pain Control prn   - DVT ppx:  hold for OR  - WBS: NWB  - NPO/IVF  - trend labs  - medicine following will continue to appreciate recs  - cleared for OR by medicine team  - dispo OR     Ortho Pager [phone]

## 2021-10-15 NOTE — PROVIDER CONTACT NOTE (OTHER) - ASSESSMENT
patient has + pedal/posterior tibial pulses to his LLE and RLE. Patient reports it feels numb when I touch his LLE

## 2021-10-15 NOTE — DIETITIAN INITIAL EVALUATION ADULT. - ADD RECOMMEND
1. As medically feasible, advance diet to CLD post-operatively.  2. Pending tolerance, continue diet advancement to DASH/TLC  3. Encourage adequate protein intake post-op

## 2021-10-16 LAB
ANISOCYTOSIS BLD QL: SLIGHT — SIGNIFICANT CHANGE UP
BASOPHILS # BLD AUTO: 0 K/UL — SIGNIFICANT CHANGE UP (ref 0–0.2)
BASOPHILS NFR BLD AUTO: 0 % — SIGNIFICANT CHANGE UP (ref 0–2)
BUN SERPL-MCNC: 11 MG/DL — SIGNIFICANT CHANGE UP (ref 7–23)
CALCIUM SERPL-MCNC: 8.9 MG/DL — SIGNIFICANT CHANGE UP (ref 8.4–10.5)
CHLORIDE SERPL-SCNC: 97 MMOL/L — SIGNIFICANT CHANGE UP (ref 96–108)
CO2 SERPL-SCNC: 26 MMOL/L — SIGNIFICANT CHANGE UP (ref 22–31)
CREAT SERPL-MCNC: 1.02 MG/DL — SIGNIFICANT CHANGE UP (ref 0.5–1.3)
EOSINOPHIL # BLD AUTO: 0 K/UL — SIGNIFICANT CHANGE UP (ref 0–0.5)
EOSINOPHIL NFR BLD AUTO: 0 % — SIGNIFICANT CHANGE UP (ref 0–6)
GLUCOSE SERPL-MCNC: 123 MG/DL — HIGH (ref 70–99)
HCT VFR BLD CALC: 39 % — SIGNIFICANT CHANGE UP (ref 39–50)
HGB BLD-MCNC: 13.5 G/DL — SIGNIFICANT CHANGE UP (ref 13–17)
LYMPHOCYTES # BLD AUTO: 1.7 K/UL — SIGNIFICANT CHANGE UP (ref 1–3.3)
LYMPHOCYTES # BLD AUTO: 11.3 % — LOW (ref 13–44)
MANUAL SMEAR VERIFICATION: SIGNIFICANT CHANGE UP
MCHC RBC-ENTMCNC: 29.7 PG — SIGNIFICANT CHANGE UP (ref 27–34)
MCHC RBC-ENTMCNC: 34.6 GM/DL — SIGNIFICANT CHANGE UP (ref 32–36)
MCV RBC AUTO: 85.9 FL — SIGNIFICANT CHANGE UP (ref 80–100)
MICROCYTES BLD QL: SLIGHT — SIGNIFICANT CHANGE UP
MONOCYTES # BLD AUTO: 1.57 K/UL — HIGH (ref 0–0.9)
MONOCYTES NFR BLD AUTO: 10.4 % — SIGNIFICANT CHANGE UP (ref 2–14)
NEUTROPHILS # BLD AUTO: 11.78 K/UL — HIGH (ref 1.8–7.4)
NEUTROPHILS NFR BLD AUTO: 78.3 % — HIGH (ref 43–77)
PLAT MORPH BLD: NORMAL — SIGNIFICANT CHANGE UP
PLATELET # BLD AUTO: 305 K/UL — SIGNIFICANT CHANGE UP (ref 150–400)
POLYCHROMASIA BLD QL SMEAR: SLIGHT — SIGNIFICANT CHANGE UP
POTASSIUM SERPL-MCNC: 4 MMOL/L — SIGNIFICANT CHANGE UP (ref 3.5–5.3)
POTASSIUM SERPL-SCNC: 4 MMOL/L — SIGNIFICANT CHANGE UP (ref 3.5–5.3)
RBC # BLD: 4.54 M/UL — SIGNIFICANT CHANGE UP (ref 4.2–5.8)
RBC # FLD: 11.9 % — SIGNIFICANT CHANGE UP (ref 10.3–14.5)
RBC BLD AUTO: ABNORMAL
SODIUM SERPL-SCNC: 131 MMOL/L — LOW (ref 135–145)
WBC # BLD: 15.05 K/UL — HIGH (ref 3.8–10.5)
WBC # FLD AUTO: 15.05 K/UL — HIGH (ref 3.8–10.5)

## 2021-10-16 PROCEDURE — 99232 SBSQ HOSP IP/OBS MODERATE 35: CPT

## 2021-10-16 RX ORDER — LANOLIN ALCOHOL/MO/W.PET/CERES
5 CREAM (GRAM) TOPICAL AT BEDTIME
Refills: 0 | Status: DISCONTINUED | OUTPATIENT
Start: 2021-10-16 | End: 2021-10-18

## 2021-10-16 RX ADMIN — HEPARIN SODIUM 5000 UNIT(S): 5000 INJECTION INTRAVENOUS; SUBCUTANEOUS at 13:23

## 2021-10-16 RX ADMIN — SENNA PLUS 2 TABLET(S): 8.6 TABLET ORAL at 21:07

## 2021-10-16 RX ADMIN — HEPARIN SODIUM 5000 UNIT(S): 5000 INJECTION INTRAVENOUS; SUBCUTANEOUS at 05:37

## 2021-10-16 RX ADMIN — Medication 100 MILLIGRAM(S): at 03:07

## 2021-10-16 RX ADMIN — HYDROMORPHONE HYDROCHLORIDE 0.5 MILLIGRAM(S): 2 INJECTION INTRAMUSCULAR; INTRAVENOUS; SUBCUTANEOUS at 15:03

## 2021-10-16 RX ADMIN — OXYCODONE HYDROCHLORIDE 10 MILLIGRAM(S): 5 TABLET ORAL at 02:47

## 2021-10-16 RX ADMIN — Medication 5000 UNIT(S): at 11:42

## 2021-10-16 RX ADMIN — OXYCODONE HYDROCHLORIDE 5 MILLIGRAM(S): 5 TABLET ORAL at 17:36

## 2021-10-16 RX ADMIN — OXYCODONE HYDROCHLORIDE 10 MILLIGRAM(S): 5 TABLET ORAL at 03:22

## 2021-10-16 RX ADMIN — HYDROMORPHONE HYDROCHLORIDE 0.5 MILLIGRAM(S): 2 INJECTION INTRAMUSCULAR; INTRAVENOUS; SUBCUTANEOUS at 10:41

## 2021-10-16 RX ADMIN — Medication 650 MILLIGRAM(S): at 21:27

## 2021-10-16 RX ADMIN — HEPARIN SODIUM 5000 UNIT(S): 5000 INJECTION INTRAVENOUS; SUBCUTANEOUS at 21:07

## 2021-10-16 RX ADMIN — HYDROMORPHONE HYDROCHLORIDE 0.5 MILLIGRAM(S): 2 INJECTION INTRAMUSCULAR; INTRAVENOUS; SUBCUTANEOUS at 00:08

## 2021-10-16 RX ADMIN — HYDROMORPHONE HYDROCHLORIDE 0.5 MILLIGRAM(S): 2 INJECTION INTRAMUSCULAR; INTRAVENOUS; SUBCUTANEOUS at 17:47

## 2021-10-16 RX ADMIN — Medication 650 MILLIGRAM(S): at 22:49

## 2021-10-16 RX ADMIN — OXYCODONE HYDROCHLORIDE 10 MILLIGRAM(S): 5 TABLET ORAL at 09:33

## 2021-10-16 RX ADMIN — OXYCODONE HYDROCHLORIDE 5 MILLIGRAM(S): 5 TABLET ORAL at 18:36

## 2021-10-16 RX ADMIN — POLYETHYLENE GLYCOL 3350 17 GRAM(S): 17 POWDER, FOR SOLUTION ORAL at 11:42

## 2021-10-16 RX ADMIN — HYDROMORPHONE HYDROCHLORIDE 0.5 MILLIGRAM(S): 2 INJECTION INTRAMUSCULAR; INTRAVENOUS; SUBCUTANEOUS at 10:11

## 2021-10-16 RX ADMIN — HYDROMORPHONE HYDROCHLORIDE 0.5 MILLIGRAM(S): 2 INJECTION INTRAMUSCULAR; INTRAVENOUS; SUBCUTANEOUS at 00:45

## 2021-10-16 RX ADMIN — SERTRALINE 50 MILLIGRAM(S): 25 TABLET, FILM COATED ORAL at 11:42

## 2021-10-16 RX ADMIN — OXYCODONE HYDROCHLORIDE 10 MILLIGRAM(S): 5 TABLET ORAL at 09:03

## 2021-10-16 RX ADMIN — Medication 50 MILLIGRAM(S): at 23:20

## 2021-10-16 NOTE — PROGRESS NOTE ADULT - SUBJECTIVE AND OBJECTIVE BOX
Patient is a 46y old  Male who presents with a chief complaint of Left femur (15 Oct 2021 11:43)      INTERVAL HPI: Pt seen and examined. States he feels well, denies any acute complaints at this time.     OVERNIGHT EVENTS: LLE numbness  T(F): 97.5 (10-16-21 @ 14:41), Max: 100.9 (10-16-21 @ 05:46)  HR: 110 (10-16-21 @ 14:41) (104 - 116)  BP: 130/80 (10-16-21 @ 14:41) (116/75 - 138/74)  RR: 18 (10-16-21 @ 14:41) (17 - 18)  SpO2: 96% (10-16-21 @ 14:41) (95% - 98%)  I&O's Summary    15 Oct 2021 07:01  -  16 Oct 2021 07:00  --------------------------------------------------------  IN: 1820 mL / OUT: 1150 mL / NET: 670 mL    16 Oct 2021 07:01  -  16 Oct 2021 17:05  --------------------------------------------------------  IN: 0 mL / OUT: 275 mL / NET: -275 mL        REVIEW OF SYSTEMS: unremarkable except that stated in hpi    PHYSICAL EXAM:  GEN: Male in NAD on RA  HEENT: NC/AT, MMM  NECK: Supple  CV: RRR, nml S1S2, no murmurs  PULM: nml effort, CTAB wo rales, rhonchi, or wheezing  ABD: Soft, non-distended, NABS, non-tender  NEURO: A/O x3, moving all extremitie, DSG C/D/I, KI for LLE, Sensation intact in L foot.   PSYCH: Appropriate      LABS:                        13.5   15.05 )-----------( 305      ( 16 Oct 2021 06:05 )             39.0     10-16    131<L>  |  97  |  11  ----------------------------<  123<H>  4.0   |  26  |  1.02    Ca    8.9      16 Oct 2021 06:05          CAPILLARY BLOOD GLUCOSE                  MEDICATIONS  (STANDING):  cholecalciferol 5000 Unit(s) Oral daily  heparin   Injectable 5000 Unit(s) SubCutaneous every 8 hours  influenza   Vaccine 0.5 milliLiter(s) IntraMuscular once  lactated ringers. 1000 milliLiter(s) (140 mL/Hr) IV Continuous <Continuous>  polyethylene glycol 3350 17 Gram(s) Oral daily  senna 2 Tablet(s) Oral at bedtime  sertraline 50 milliGRAM(s) Oral daily    MEDICATIONS  (PRN):  acetaminophen   Tablet .. 650 milliGRAM(s) Oral every 6 hours PRN Temp greater or equal to 38C (100.4F), Mild Pain (1 - 3)  diphenhydrAMINE 50 milliGRAM(s) Oral at bedtime PRN Insomnia  HYDROmorphone  Injectable 0.5 milliGRAM(s) IV Push every 4 hours PRN breakthrough pain  magnesium hydroxide Suspension 30 milliLiter(s) Oral daily PRN Constipation  melatonin 5 milliGRAM(s) Oral at bedtime PRN Sleep  morphine  - Injectable 2 milliGRAM(s) IV Push every 15 minutes PRN Severe Pain (7 - 10)  oxyCODONE    IR 10 milliGRAM(s) Oral every 4 hours PRN Severe Pain (7 - 10)  oxyCODONE    IR 5 milliGRAM(s) Oral every 4 hours PRN Moderate Pain (4 - 6)

## 2021-10-16 NOTE — PROGRESS NOTE ADULT - SUBJECTIVE AND OBJECTIVE BOX
Ortho Note    Pt seen and examined at 5 AM for q4 compartment checks. L knee pain and cramping have improved significantly since loosening of acewrap. Denies CP, SOB, N/V, numbness/tingling.    Vital Signs Last 24 Hrs  T(C): --  T(F): --  HR: --  BP: --  BP(mean): --  RR: --  SpO2: --  I&O's Summary    14 Oct 2021 07:01  -  15 Oct 2021 07:00  --------------------------------------------------------  IN: 1120 mL / OUT: 1750 mL / NET: -630 mL    15 Oct 2021 07:01  -  16 Oct 2021 02:22  --------------------------------------------------------  IN: 280 mL / OUT: 350 mL / NET: -70 mL        Physical Exam:  General: Pt Alert and oriented, NAD  DSG C/D/I, KI for LLE  Pulses: 2+ dp, pt pulses, toes wwp, cap refill <3 seconds  Sensation: SILT sural/saph/sp/dp/ tibial distributions  Motor: EHL/FHL/TA/GS firing equally bilaterally  *compartment check at 5AM 10/16*  anterior, lateral, and superficial posterior compartments soft and compressible  No pain with passive or active stretching of toes                          15.3   10.14 )-----------( 277      ( 14 Oct 2021 07:53 )             45.9     10-14    140  |  102  |  13  ----------------------------<  93  4.2   |  27  |  1.20    Ca    9.6      14 Oct 2021 07:53        A/P: 46yMale POD#1 s/p L distal femur ORIF by Dr. Lan 10/15  - Stable  - Pain Control  - continue to monitor with q4 compartment checks, no concern for ACS at this time.  - f/u AM labs  - DVT ppx: SQH  - Post op abx: Ancef   - PT, WBS: NWANAHI LLE in KI  - Dispo: pending PT gary Kim, PGY-1  Ortho Pager [phone]

## 2021-10-16 NOTE — PROGRESS NOTE ADULT - SUBJECTIVE AND OBJECTIVE BOX
Ortho Note    Pt seen and examined at 11PM (10/15) and at 1:10 AM (10/16) for q4 compartment checks. Pt complaining of L knee pain and L thigh muscle cramps. Acewrap was loosened at 11PM, and since then, patient reports decrease in pain and tightness.  Denies CP, SOB, N/V, numbness/tingling     Vital Signs Last 24 Hrs  T(C): --  T(F): --  HR: --  BP: --  BP(mean): --  RR: --  SpO2: --  I&O's Summary    14 Oct 2021 07:01  -  15 Oct 2021 07:00  --------------------------------------------------------  IN: 1120 mL / OUT: 1750 mL / NET: -630 mL    15 Oct 2021 07:01  -  16 Oct 2021 02:22  --------------------------------------------------------  IN: 280 mL / OUT: 350 mL / NET: -70 mL        Physical Exam:  General: Pt Alert and oriented, NAD  DSG C/D/I, KI for LLE  Pulses: 2+ dp, pt pulses, toes wwp, cap refill <3 seconds  Sensation: SILT sural/saph/sp/dp/ tibial distributions  Motor: EHL/FHL/TA/GS firing equally bilaterally  *compartment check at 11PM 10/15*  anterior, lateral, and superficial posterior compartments soft and compressible  No pain with passive or active stretching of toes  *compartment check at 1:10AM 10/16*  anterior, lateral, and superficial posterior compartments soft and compressible  No pain with passive or active stretching of toes                          15.3   10.14 )-----------( 277      ( 14 Oct 2021 07:53 )             45.9     10-14    140  |  102  |  13  ----------------------------<  93  4.2   |  27  |  1.20    Ca    9.6      14 Oct 2021 07:53        A/P: 46yMale POD#1 s/p L distal femur ORIF by Dr. Lan 10/15  - Stable  - Pain Control  - continue to monitor with q4 compartment checks, no concern for ACS at this time.  - f/u AM labs  - DVT ppx: SQH  - Post op abx: Ancef   - PT, WBS: NWB LLE in KI  - Dispo: pending PT gary Kim, PGY-1  Ortho Pager [phone]

## 2021-10-16 NOTE — PROGRESS NOTE ADULT - ASSESSMENT
46M w h/o HLD not on medications, Anxiety, Obesity (32) p/w L knee pain after injury at the gym found to have patellar dislocation and LFC fracture - for OR w Dr. Lan - co-management called for pre-op evaluation, found also to +parainfluenza on RVP in setting of URI sxs    #Postoperative state: POD#1 s/p L distal femur ORIF by Dr. Lan 10/15  Plan as per ortho  - Stable  - Pain Control  - continue to monitor with q4 compartment checks, no concern for ACS at this time.  - f/u AM labs  - DVT ppx: SQH  - Post op abx: Ancef   - PT, WBS: NWB LLE in KI  - Dispo: pending PT eval      #L patellar dislocation w LFC fracture - management per orthopedics  #Parainfluenza - symptoms improving. Likely 2/2 sick contact from child  #HLD - not on medication - fish oil supplements  #Anxiety - chronic, at baseline. c/w home sertraline 50mg  #Leukocytosis - elevated likely reactrive    Recommendations  -postoperative care  -reinforced incentive spirometry  -pain control  -Symptomatic management for viral URI - consider PRN anti-histamines. Pt aware.  -Can hold ozempic at this time  -vte ppx: heparin subq    PMD: Dr. Camilo Fernandez - [facility]  DISPO: TBD pending  post-op PT. Pt lives in elevator building - 1 step 46M w h/o HLD not on medications, Anxiety, Obesity (32) p/w L knee pain after injury at the gym found to have patellar dislocation and LFC fracture - for OR w Dr. Lan - co-management called for pre-op evaluation, found also to +parainfluenza on RVP in setting of URI sxs    #Postoperative state: POD#1 s/p L distal femur ORIF by Dr. Lan 10/15  Plan as per ortho  - Stable  - Pain Control  - continue to monitor with q4 compartment checks, no concern for ACS at this time.  - f/u AM labs  - DVT ppx: SQH  - Post op abx: Ancef   - PT, WBS: NWB LLE in KI  - Dispo: pending PT eval    #hyponatremia-monitor and trend if still low switch ivf to normal saline, monitor on diet for now  #Parainfluenza - symptoms improving. Likely 2/2 sick contact from child  #HLD - not on medication - fish oil supplements  #Anxiety - chronic, at baseline. c/w home sertraline 50mg  #Leukocytosis - elevated likely reactrive    Recommendations  -postoperative care  -reinforced incentive spirometry  -pain control  -Symptomatic management for viral URI - consider PRN anti-histamines. Pt aware.  -Can hold ozempic at this time  -vte ppx: heparin subq    PMD: Dr. Camilo Fernandez - [facility]  DISPO: TBD pending  post-op PT. Pt lives in elevator building - 1 step

## 2021-10-17 LAB
ANION GAP SERPL CALC-SCNC: 8 MMOL/L — SIGNIFICANT CHANGE UP (ref 5–17)
BUN SERPL-MCNC: 10 MG/DL — SIGNIFICANT CHANGE UP (ref 7–23)
CALCIUM SERPL-MCNC: 9.2 MG/DL — SIGNIFICANT CHANGE UP (ref 8.4–10.5)
CHLORIDE SERPL-SCNC: 100 MMOL/L — SIGNIFICANT CHANGE UP (ref 96–108)
CO2 SERPL-SCNC: 27 MMOL/L — SIGNIFICANT CHANGE UP (ref 22–31)
CREAT SERPL-MCNC: 1.22 MG/DL — SIGNIFICANT CHANGE UP (ref 0.5–1.3)
GLUCOSE SERPL-MCNC: 105 MG/DL — HIGH (ref 70–99)
HCT VFR BLD CALC: 39.5 % — SIGNIFICANT CHANGE UP (ref 39–50)
HGB BLD-MCNC: 13.5 G/DL — SIGNIFICANT CHANGE UP (ref 13–17)
MCHC RBC-ENTMCNC: 29.7 PG — SIGNIFICANT CHANGE UP (ref 27–34)
MCHC RBC-ENTMCNC: 34.2 GM/DL — SIGNIFICANT CHANGE UP (ref 32–36)
MCV RBC AUTO: 87 FL — SIGNIFICANT CHANGE UP (ref 80–100)
NRBC # BLD: 0 /100 WBCS — SIGNIFICANT CHANGE UP (ref 0–0)
PLATELET # BLD AUTO: 279 K/UL — SIGNIFICANT CHANGE UP (ref 150–400)
POTASSIUM SERPL-MCNC: 3.8 MMOL/L — SIGNIFICANT CHANGE UP (ref 3.5–5.3)
POTASSIUM SERPL-SCNC: 3.8 MMOL/L — SIGNIFICANT CHANGE UP (ref 3.5–5.3)
RBC # BLD: 4.54 M/UL — SIGNIFICANT CHANGE UP (ref 4.2–5.8)
RBC # FLD: 11.9 % — SIGNIFICANT CHANGE UP (ref 10.3–14.5)
SODIUM SERPL-SCNC: 135 MMOL/L — SIGNIFICANT CHANGE UP (ref 135–145)
WBC # BLD: 11.66 K/UL — HIGH (ref 3.8–10.5)
WBC # FLD AUTO: 11.66 K/UL — HIGH (ref 3.8–10.5)

## 2021-10-17 PROCEDURE — 99232 SBSQ HOSP IP/OBS MODERATE 35: CPT

## 2021-10-17 RX ADMIN — OXYCODONE HYDROCHLORIDE 10 MILLIGRAM(S): 5 TABLET ORAL at 22:36

## 2021-10-17 RX ADMIN — HEPARIN SODIUM 5000 UNIT(S): 5000 INJECTION INTRAVENOUS; SUBCUTANEOUS at 06:50

## 2021-10-17 RX ADMIN — HYDROMORPHONE HYDROCHLORIDE 0.5 MILLIGRAM(S): 2 INJECTION INTRAMUSCULAR; INTRAVENOUS; SUBCUTANEOUS at 23:10

## 2021-10-17 RX ADMIN — OXYCODONE HYDROCHLORIDE 10 MILLIGRAM(S): 5 TABLET ORAL at 09:02

## 2021-10-17 RX ADMIN — Medication 5000 UNIT(S): at 11:33

## 2021-10-17 RX ADMIN — HEPARIN SODIUM 5000 UNIT(S): 5000 INJECTION INTRAVENOUS; SUBCUTANEOUS at 15:15

## 2021-10-17 RX ADMIN — OXYCODONE HYDROCHLORIDE 10 MILLIGRAM(S): 5 TABLET ORAL at 17:04

## 2021-10-17 RX ADMIN — HEPARIN SODIUM 5000 UNIT(S): 5000 INJECTION INTRAVENOUS; SUBCUTANEOUS at 21:43

## 2021-10-17 RX ADMIN — SERTRALINE 50 MILLIGRAM(S): 25 TABLET, FILM COATED ORAL at 11:33

## 2021-10-17 RX ADMIN — OXYCODONE HYDROCHLORIDE 10 MILLIGRAM(S): 5 TABLET ORAL at 08:12

## 2021-10-17 RX ADMIN — OXYCODONE HYDROCHLORIDE 10 MILLIGRAM(S): 5 TABLET ORAL at 21:43

## 2021-10-17 RX ADMIN — POLYETHYLENE GLYCOL 3350 17 GRAM(S): 17 POWDER, FOR SOLUTION ORAL at 11:33

## 2021-10-17 RX ADMIN — OXYCODONE HYDROCHLORIDE 10 MILLIGRAM(S): 5 TABLET ORAL at 16:23

## 2021-10-17 RX ADMIN — SENNA PLUS 2 TABLET(S): 8.6 TABLET ORAL at 21:42

## 2021-10-17 NOTE — PROGRESS NOTE ADULT - SUBJECTIVE AND OBJECTIVE BOX
Ortho Note    Pt seen and examined at 5 AM for q4 compartment checks. Dressings taken down and changed. Denies CP, SOB, N/V, numbness/tingling.    Vital Signs Last 24 Hrs  T(C): 37.2 (17 Oct 2021 13:57), Max: 38.2 (16 Oct 2021 21:15)  T(F): 99 (17 Oct 2021 13:57), Max: 100.8 (16 Oct 2021 21:15)  HR: 98 (17 Oct 2021 13:57) (97 - 109)  BP: 112/73 (17 Oct 2021 13:57) (106/68 - 122/70)  BP(mean): --  RR: 18 (17 Oct 2021 13:57) (16 - 18)  SpO2: 97% (17 Oct 2021 13:57) (95% - 98%)    Physical Exam:  General: Pt Alert and oriented, NAD  DSG C/D/I, KI for LLE  Pulses: 2+ dp, pt pulses, toes wwp, cap refill <3 seconds  Sensation: SILT sural/saph/sp/dp/ tibial distributions  Motor: EHL/FHL/TA/GS firing equally bilaterally  anterior, lateral, and superficial posterior compartments soft and compressible  No pain with passive or active stretching of toes                                     13.5   11.66 )-----------( 279      ( 17 Oct 2021 06:05 )             39.5         A/P: 46yMale s/p L distal femur ORIF by Dr. Lan 10/15  - Stable  - Pain Control  - f/u AM labs  - DVT ppx: SQH  - Post op abx: Ancef   - PT, WBS: NWB LLE in KI  - Dispo: pending PT clearance

## 2021-10-17 NOTE — CHART NOTE - NSCHARTNOTEFT_GEN_A_CORE
Compartment Check Note  10/16, 10:00AM  Pt in ilir brace locked in extension, ACE/Webril  Thigh, Knee and leg examined through small portals made in dressing  Anterior/Lateral/SF posterior compartments soft and compressible  Knee moderately swollen but compressible  Anterior/Medial/Posterior thigh compartments soft and compressible  Motor 5/5 TA/EHL/GS  SILT grossly intact SPN/DPN/Saph/Kayleigh/Tib  DP 2+, foot WWP  No pain on passive stretch of great toe  Low suspicion for compartment syndrome at this time  Continue ice and elevation
Compartment Check Note  10/16, 11:00PM  Pt in ilir brace locked in extension, ACE/Webril  Thigh, Knee and leg examined through small portals made in dressing  Anterior/Lateral/SF posterior compartments soft and compressible  Knee moderately swollen but compressible, improved compared to prior exam  Anterior/Medial/Posterior thigh compartments soft and compressible  Motor 5/5 TA/EHL/GS  SILT grossly intact SPN/DPN/Saph/Kayleigh/Tib  DP 2+, foot WWP  No pain on passive stretch of great toe  Low suspicion for compartment syndrome at this time  Continue ice and elevation
Compartment Check Note  10/16, 5:00PM  Pt in ilir brace locked in extension, ACE/Webril  Thigh, Knee and leg examined through small portals made in dressing  Anterior/Lateral/SF posterior compartments soft and compressible  Knee moderately swollen but compressible  Anterior/Medial/Posterior thigh compartments soft and compressible  Motor 5/5 TA/EHL/GS  SILT grossly intact SPN/DPN/Saph/Kayleigh/Tib  DP 2+, foot WWP  No pain on passive stretch of great toe  Low suspicion for compartment syndrome at this time  Continue ice and elevation

## 2021-10-17 NOTE — PROGRESS NOTE ADULT - ASSESSMENT
46M w h/o HLD not on medications, Anxiety, Obesity (32) p/w L knee pain after injury at the gym found to have patellar dislocation and LFC fracture - for OR w Dr. Lan - co-management called for pre-op evaluation, found also to +parainfluenza on RVP in setting of URI sxs    #Postoperative state: POD#2 s/p L distal femur ORIF by Dr. Lan 10/15  Plan as per ortho  - Stable  - Pain Control  - continue to monitor with q4 compartment checks, no concern for ACS at this time.  - f/u AM labs  - DVT ppx: SQH  - Post op abx: Ancef   - PT, WBS: NWB LLE in KI  - Dispo: PT eval apprec for home with home PT and home care, dc likely tomorrow    #hyponatremia-improved and wnl  #Parainfluenza - symptoms improving. Likely 2/2 sick contact from child  #HLD - not on medication - fish oil supplements  #Anxiety - chronic, at baseline. c/w home sertraline 50mg  #Leukocytosis - now downtredning, likely reactive etiology    Recommendations  -postoperative care  -reinforced incentive spirometry  -pain control  -Symptomatic management for viral URI - consider PRN anti-histamines. Pt aware.  -Can hold ozempic at this time  -vte ppx: heparin subq    PMD: Dr. Camilo Fernandez - [facility]  DISPO: DC tomorrow with home care

## 2021-10-17 NOTE — PROGRESS NOTE ADULT - SUBJECTIVE AND OBJECTIVE BOX
Patient is a 46y old  Male who presents with a chief complaint of Left femur (15 Oct 2021 11:43)      INTERVAL HPI:  OVERNIGHT EVENTS:  T(F): 98.8 (10-17-21 @ 09:26), Max: 100.8 (10-16-21 @ 21:15)  HR: 97 (10-17-21 @ 09:26) (97 - 110)  BP: 106/68 (10-17-21 @ 09:26) (106/68 - 130/80)  RR: 18 (10-17-21 @ 09:26) (16 - 18)  SpO2: 95% (10-17-21 @ 09:26) (95% - 98%)  I&O's Summary    16 Oct 2021 07:01  -  17 Oct 2021 07:00  --------------------------------------------------------  IN: 0 mL / OUT: 275 mL / NET: -275 mL    17 Oct 2021 07:01  -  17 Oct 2021 10:50  --------------------------------------------------------  IN: 0 mL / OUT: 300 mL / NET: -300 mL        REVIEW OF SYSTEMS:  CONSTITUTIONAL: No fever, weight loss, or fatigue  EYES: No eye pain, visual disturbances, or discharge  ENMT:  No difficulty hearing, tinnitus, vertigo; No sinus or throat pain  NECK: No pain or stiffness  BREASTS: No pain, masses, or nipple discharge  RESPIRATORY: No cough, wheezing, chills or hemoptysis; No shortness of breath  CARDIOVASCULAR: No chest pain, palpitations, dizziness, or leg swelling  GASTROINTESTINAL: No abdominal or epigastric pain. No nausea, vomiting, or hematemesis; No diarrhea or constipation. No melena or hematochezia.  GENITOURINARY: No dysuria, frequency, hematuria, or incontinence  NEUROLOGICAL: No headaches, memory loss, loss of strength, numbness, or tremors  SKIN: No itching, burning, rashes, or lesions   LYMPH NODES: No enlarged glands  ENDOCRINE: No heat or cold intolerance; No hair loss  MUSCULOSKELETAL: No joint pain or swelling; No muscle, back, or extremity pain  PSYCHIATRIC: No depression, anxiety, mood swings, or difficulty sleeping  HEME/LYMPH: No easy bruising, or bleeding gums  ALLERY AND IMMUNOLOGIC: No hives or eczema    PHYSICAL EXAM:  GENERAL: NAD, well-groomed, well-developed  HEAD:  Atraumatic, Normocephalic  EYES: EOMI, PERRLA, conjunctiva and sclera clear  ENMT: No tonsillar erythema, exudates, or enlargement; Moist mucous membranes, Good dentition, No lesions  NECK: Supple, No JVD, Normal thyroid  NERVOUS SYSTEM:  Alert & Oriented X3, Good concentration; Motor Strength 5/5 B/L upper and lower extremities; DTRs 2+ intact and symmetric  CHEST/LUNG: Clear to percussion bilaterally; No rales, rhonchi, wheezing, or rubs  HEART: Regular rate and rhythm; No murmurs, rubs, or gallops  ABDOMEN: Soft, Nontender, Nondistended; Bowel sounds present  EXTREMITIES:  2+ Peripheral Pulses, No clubbing, cyanosis, or edema  LYMPH: No lymphadenopathy noted  SKIN: No rashes or lesions    LABS:                        13.5   11.66 )-----------( 279      ( 17 Oct 2021 06:05 )             39.5     10-17    135  |  100  |  10  ----------------------------<  105<H>  3.8   |  27  |  1.22    Ca    9.2      17 Oct 2021 06:05          CAPILLARY BLOOD GLUCOSE                  MEDICATIONS  (STANDING):  cholecalciferol 5000 Unit(s) Oral daily  heparin   Injectable 5000 Unit(s) SubCutaneous every 8 hours  influenza   Vaccine 0.5 milliLiter(s) IntraMuscular once  lactated ringers. 1000 milliLiter(s) (140 mL/Hr) IV Continuous <Continuous>  polyethylene glycol 3350 17 Gram(s) Oral daily  senna 2 Tablet(s) Oral at bedtime  sertraline 50 milliGRAM(s) Oral daily    MEDICATIONS  (PRN):  acetaminophen   Tablet .. 650 milliGRAM(s) Oral every 6 hours PRN Temp greater or equal to 38C (100.4F), Mild Pain (1 - 3)  diphenhydrAMINE 50 milliGRAM(s) Oral at bedtime PRN Insomnia  HYDROmorphone  Injectable 0.5 milliGRAM(s) IV Push every 4 hours PRN breakthrough pain  magnesium hydroxide Suspension 30 milliLiter(s) Oral daily PRN Constipation  melatonin 5 milliGRAM(s) Oral at bedtime PRN Sleep  morphine  - Injectable 2 milliGRAM(s) IV Push every 15 minutes PRN Severe Pain (7 - 10)  oxyCODONE    IR 10 milliGRAM(s) Oral every 4 hours PRN Severe Pain (7 - 10)  oxyCODONE    IR 5 milliGRAM(s) Oral every 4 hours PRN Moderate Pain (4 - 6)       Patient is a 46y old  Male who presents with a chief complaint of Left femur (15 Oct 2021 11:43)      INTERVAL HPI: Pt seen and examined. States he is feeling better, pain is better controlled today. Denies any other acute complaints at this time.    OVERNIGHT EVENTS: none noted  T(F): 98.8 (10-17-21 @ 09:26), Max: 100.8 (10-16-21 @ 21:15)  HR: 97 (10-17-21 @ 09:26) (97 - 110)  BP: 106/68 (10-17-21 @ 09:26) (106/68 - 130/80)  RR: 18 (10-17-21 @ 09:26) (16 - 18)  SpO2: 95% (10-17-21 @ 09:26) (95% - 98%)  I&O's Summary    16 Oct 2021 07:01  -  17 Oct 2021 07:00  --------------------------------------------------------  IN: 0 mL / OUT: 275 mL / NET: -275 mL    17 Oct 2021 07:01  -  17 Oct 2021 10:50  --------------------------------------------------------  IN: 0 mL / OUT: 300 mL / NET: -300 mL        REVIEW OF SYSTEMS: comprehensive ros except that stated in hpi    PHYSICAL EXAM:  GEN: Male in NAD on RA  HEENT: NC/AT, MMM  NECK: Supple  CV: RRR, nml S1S2, no murmurs  PULM: nml effort, CTAB wo rales, rhonchi, or wheezing  ABD: Soft, non-distended, NABS, non-tender  NEURO: A/O x3, moving all extremitie, DSG C/D/I, KI for LLE, Sensation intact in L foot.   PSYCH: Appropriate lesions    LABS:                        13.5   11.66 )-----------( 279      ( 17 Oct 2021 06:05 )             39.5     10-17    135  |  100  |  10  ----------------------------<  105<H>  3.8   |  27  |  1.22    Ca    9.2      17 Oct 2021 06:05          CAPILLARY BLOOD GLUCOSE                  MEDICATIONS  (STANDING):  cholecalciferol 5000 Unit(s) Oral daily  heparin   Injectable 5000 Unit(s) SubCutaneous every 8 hours  influenza   Vaccine 0.5 milliLiter(s) IntraMuscular once  lactated ringers. 1000 milliLiter(s) (140 mL/Hr) IV Continuous <Continuous>  polyethylene glycol 3350 17 Gram(s) Oral daily  senna 2 Tablet(s) Oral at bedtime  sertraline 50 milliGRAM(s) Oral daily    MEDICATIONS  (PRN):  acetaminophen   Tablet .. 650 milliGRAM(s) Oral every 6 hours PRN Temp greater or equal to 38C (100.4F), Mild Pain (1 - 3)  diphenhydrAMINE 50 milliGRAM(s) Oral at bedtime PRN Insomnia  HYDROmorphone  Injectable 0.5 milliGRAM(s) IV Push every 4 hours PRN breakthrough pain  magnesium hydroxide Suspension 30 milliLiter(s) Oral daily PRN Constipation  melatonin 5 milliGRAM(s) Oral at bedtime PRN Sleep  morphine  - Injectable 2 milliGRAM(s) IV Push every 15 minutes PRN Severe Pain (7 - 10)  oxyCODONE    IR 10 milliGRAM(s) Oral every 4 hours PRN Severe Pain (7 - 10)  oxyCODONE    IR 5 milliGRAM(s) Oral every 4 hours PRN Moderate Pain (4 - 6)

## 2021-10-18 ENCOUNTER — TRANSCRIPTION ENCOUNTER (OUTPATIENT)
Age: 46
End: 2021-10-18

## 2021-10-18 VITALS
SYSTOLIC BLOOD PRESSURE: 110 MMHG | TEMPERATURE: 98 F | DIASTOLIC BLOOD PRESSURE: 72 MMHG | RESPIRATION RATE: 18 BRPM | OXYGEN SATURATION: 98 % | HEART RATE: 92 BPM

## 2021-10-18 LAB
ANION GAP SERPL CALC-SCNC: 7 MMOL/L — SIGNIFICANT CHANGE UP (ref 5–17)
BUN SERPL-MCNC: 10 MG/DL — SIGNIFICANT CHANGE UP (ref 7–23)
CALCIUM SERPL-MCNC: 9.2 MG/DL — SIGNIFICANT CHANGE UP (ref 8.4–10.5)
CHLORIDE SERPL-SCNC: 97 MMOL/L — SIGNIFICANT CHANGE UP (ref 96–108)
CO2 SERPL-SCNC: 29 MMOL/L — SIGNIFICANT CHANGE UP (ref 22–31)
CREAT SERPL-MCNC: 0.98 MG/DL — SIGNIFICANT CHANGE UP (ref 0.5–1.3)
GLUCOSE SERPL-MCNC: 105 MG/DL — HIGH (ref 70–99)
HCT VFR BLD CALC: 39.6 % — SIGNIFICANT CHANGE UP (ref 39–50)
HGB BLD-MCNC: 13.1 G/DL — SIGNIFICANT CHANGE UP (ref 13–17)
MCHC RBC-ENTMCNC: 29.2 PG — SIGNIFICANT CHANGE UP (ref 27–34)
MCHC RBC-ENTMCNC: 33.1 GM/DL — SIGNIFICANT CHANGE UP (ref 32–36)
MCV RBC AUTO: 88.2 FL — SIGNIFICANT CHANGE UP (ref 80–100)
NRBC # BLD: 0 /100 WBCS — SIGNIFICANT CHANGE UP (ref 0–0)
PLATELET # BLD AUTO: 294 K/UL — SIGNIFICANT CHANGE UP (ref 150–400)
POTASSIUM SERPL-MCNC: 4.2 MMOL/L — SIGNIFICANT CHANGE UP (ref 3.5–5.3)
POTASSIUM SERPL-SCNC: 4.2 MMOL/L — SIGNIFICANT CHANGE UP (ref 3.5–5.3)
RBC # BLD: 4.49 M/UL — SIGNIFICANT CHANGE UP (ref 4.2–5.8)
RBC # FLD: 12 % — SIGNIFICANT CHANGE UP (ref 10.3–14.5)
SODIUM SERPL-SCNC: 133 MMOL/L — LOW (ref 135–145)
WBC # BLD: 9.35 K/UL — SIGNIFICANT CHANGE UP (ref 3.8–10.5)
WBC # FLD AUTO: 9.35 K/UL — SIGNIFICANT CHANGE UP (ref 3.8–10.5)

## 2021-10-18 RX ORDER — OXYCODONE HYDROCHLORIDE 5 MG/1
1 TABLET ORAL
Qty: 42 | Refills: 0
Start: 2021-10-18 | End: 2021-10-24

## 2021-10-18 RX ORDER — ASPIRIN/CALCIUM CARB/MAGNESIUM 324 MG
1 TABLET ORAL
Qty: 30 | Refills: 0
Start: 2021-10-18 | End: 2021-11-16

## 2021-10-18 RX ADMIN — HYDROMORPHONE HYDROCHLORIDE 0.5 MILLIGRAM(S): 2 INJECTION INTRAMUSCULAR; INTRAVENOUS; SUBCUTANEOUS at 00:36

## 2021-10-18 RX ADMIN — Medication 5000 UNIT(S): at 11:18

## 2021-10-18 RX ADMIN — SERTRALINE 50 MILLIGRAM(S): 25 TABLET, FILM COATED ORAL at 11:18

## 2021-10-18 RX ADMIN — OXYCODONE HYDROCHLORIDE 10 MILLIGRAM(S): 5 TABLET ORAL at 07:20

## 2021-10-18 RX ADMIN — OXYCODONE HYDROCHLORIDE 10 MILLIGRAM(S): 5 TABLET ORAL at 12:34

## 2021-10-18 RX ADMIN — OXYCODONE HYDROCHLORIDE 10 MILLIGRAM(S): 5 TABLET ORAL at 06:55

## 2021-10-18 NOTE — DISCHARGE NOTE NURSING/CASE MANAGEMENT/SOCIAL WORK - PATIENT PORTAL LINK FT
You can access the FollowMyHealth Patient Portal offered by [facility] by registering at the following website: [email or web address]. By joining [employer]’s FollowMyHealth portal, you will also be able to view your health information using other applications (apps) compatible with our system.

## 2021-10-18 NOTE — PROGRESS NOTE ADULT - PROVIDER SPECIALTY LIST ADULT
Hospitalist
Hospitalist
Orthopedics
Hospitalist
Hospitalist
Orthopedics
Hospitalist

## 2021-10-18 NOTE — PROGRESS NOTE ADULT - SUBJECTIVE AND OBJECTIVE BOX
Ortho Note    Pt seen and examined this am. Pain controlled. Cleared by PT. Denies CP, SOB, N/V, numbness/tingling.    Vital Signs Last 24 Hrs  T(C): 36.9 (18 Oct 2021 05:10), Max: 37.2 (17 Oct 2021 13:57)  T(F): 98.5 (18 Oct 2021 05:10), Max: 99 (17 Oct 2021 13:57)  HR: 87 (18 Oct 2021 05:10) (87 - 110)  BP: 111/73 (18 Oct 2021 05:10) (106/68 - 122/77)  BP(mean): 86 (18 Oct 2021 05:10) (86 - 86)  RR: 17 (18 Oct 2021 05:10) (17 - 18)  SpO2: 96% (18 Oct 2021 05:10) (95% - 97%)    Physical Exam:  General: Pt Alert and oriented, NAD  DSG C/D/I, hinged knee brace locked in extension   Pulses: 2+ PT, toes wwp   Sensation: SILT sural/saph/sp/dp/tibial distributions  Motor: EHL/FHL/TA/GS firing  No pain with passive or active stretching of toes               A/P: 46yMale s/p L distal femur ORIF by Dr. Lan 10/15  - Pain Control  - f/u AM labs  - DVT ppx: SQH  - Post op abx: Ancef   - PT, WBS: NWB LLE in hinged knee brace locked in extension, no ROM   - Dispo: HPT today

## 2021-10-25 ENCOUNTER — OUTPATIENT (OUTPATIENT)
Dept: OUTPATIENT SERVICES | Facility: HOSPITAL | Age: 46
LOS: 1 days | End: 2021-10-25
Payer: COMMERCIAL

## 2021-10-25 PROBLEM — E78.00 PURE HYPERCHOLESTEROLEMIA, UNSPECIFIED: Chronic | Status: ACTIVE | Noted: 2021-10-11

## 2021-10-25 PROBLEM — F41.9 ANXIETY DISORDER, UNSPECIFIED: Chronic | Status: ACTIVE | Noted: 2021-10-11

## 2021-10-25 PROCEDURE — 73560 X-RAY EXAM OF KNEE 1 OR 2: CPT

## 2021-10-25 PROCEDURE — 73560 X-RAY EXAM OF KNEE 1 OR 2: CPT | Mod: 26,LT

## 2021-11-01 PROCEDURE — 81003 URINALYSIS AUTO W/O SCOPE: CPT

## 2021-11-01 PROCEDURE — 85027 COMPLETE CBC AUTOMATED: CPT

## 2021-11-01 PROCEDURE — U0003: CPT

## 2021-11-01 PROCEDURE — 73700 CT LOWER EXTREMITY W/O DYE: CPT | Mod: MA

## 2021-11-01 PROCEDURE — C1713: CPT

## 2021-11-01 PROCEDURE — 99285 EMERGENCY DEPT VISIT HI MDM: CPT

## 2021-11-01 PROCEDURE — 73564 X-RAY EXAM KNEE 4 OR MORE: CPT

## 2021-11-01 PROCEDURE — 86900 BLOOD TYPING SEROLOGIC ABO: CPT

## 2021-11-01 PROCEDURE — 76000 FLUOROSCOPY <1 HR PHYS/QHP: CPT

## 2021-11-01 PROCEDURE — 86769 SARS-COV-2 COVID-19 ANTIBODY: CPT

## 2021-11-01 PROCEDURE — 85025 COMPLETE CBC W/AUTO DIFF WBC: CPT

## 2021-11-01 PROCEDURE — 86850 RBC ANTIBODY SCREEN: CPT

## 2021-11-01 PROCEDURE — 85730 THROMBOPLASTIN TIME PARTIAL: CPT

## 2021-11-01 PROCEDURE — 97161 PT EVAL LOW COMPLEX 20 MIN: CPT

## 2021-11-01 PROCEDURE — 73560 X-RAY EXAM OF KNEE 1 OR 2: CPT

## 2021-11-01 PROCEDURE — 86901 BLOOD TYPING SEROLOGIC RH(D): CPT

## 2021-11-01 PROCEDURE — 0225U NFCT DS DNA&RNA 21 SARSCOV2: CPT

## 2021-11-01 PROCEDURE — 36415 COLL VENOUS BLD VENIPUNCTURE: CPT

## 2021-11-01 PROCEDURE — 96375 TX/PRO/DX INJ NEW DRUG ADDON: CPT

## 2021-11-01 PROCEDURE — 87635 SARS-COV-2 COVID-19 AMP PRB: CPT

## 2021-11-01 PROCEDURE — 80048 BASIC METABOLIC PNL TOTAL CA: CPT

## 2021-11-01 PROCEDURE — 71045 X-RAY EXAM CHEST 1 VIEW: CPT

## 2021-11-01 PROCEDURE — 85610 PROTHROMBIN TIME: CPT

## 2021-11-01 PROCEDURE — U0005: CPT

## 2021-11-01 PROCEDURE — 97164 PT RE-EVAL EST PLAN CARE: CPT

## 2021-11-01 PROCEDURE — 96374 THER/PROPH/DIAG INJ IV PUSH: CPT

## 2021-11-01 PROCEDURE — 80053 COMPREHEN METABOLIC PANEL: CPT

## 2021-11-03 ENCOUNTER — OUTPATIENT (OUTPATIENT)
Dept: OUTPATIENT SERVICES | Facility: HOSPITAL | Age: 46
LOS: 1 days | End: 2021-11-03
Payer: COMMERCIAL

## 2021-11-03 PROCEDURE — 73560 X-RAY EXAM OF KNEE 1 OR 2: CPT | Mod: 26,LT

## 2021-11-03 PROCEDURE — 73560 X-RAY EXAM OF KNEE 1 OR 2: CPT

## 2021-11-17 ENCOUNTER — OUTPATIENT (OUTPATIENT)
Dept: OUTPATIENT SERVICES | Facility: HOSPITAL | Age: 46
LOS: 1 days | End: 2021-11-17
Payer: COMMERCIAL

## 2021-11-17 PROCEDURE — 73560 X-RAY EXAM OF KNEE 1 OR 2: CPT

## 2021-11-17 PROCEDURE — 73560 X-RAY EXAM OF KNEE 1 OR 2: CPT | Mod: 26,LT

## 2021-12-07 ENCOUNTER — OUTPATIENT (OUTPATIENT)
Dept: OUTPATIENT SERVICES | Facility: HOSPITAL | Age: 46
LOS: 1 days | End: 2021-12-07
Payer: COMMERCIAL

## 2021-12-07 PROCEDURE — 73560 X-RAY EXAM OF KNEE 1 OR 2: CPT | Mod: 26,LT

## 2021-12-07 PROCEDURE — 73560 X-RAY EXAM OF KNEE 1 OR 2: CPT

## 2021-12-21 ENCOUNTER — OUTPATIENT (OUTPATIENT)
Dept: OUTPATIENT SERVICES | Facility: HOSPITAL | Age: 46
LOS: 1 days | End: 2021-12-21
Payer: COMMERCIAL

## 2021-12-21 PROCEDURE — 73560 X-RAY EXAM OF KNEE 1 OR 2: CPT

## 2021-12-21 PROCEDURE — 73560 X-RAY EXAM OF KNEE 1 OR 2: CPT | Mod: 26,LT

## 2022-01-12 ENCOUNTER — OUTPATIENT (OUTPATIENT)
Dept: OUTPATIENT SERVICES | Facility: HOSPITAL | Age: 47
LOS: 1 days | End: 2022-01-12
Payer: COMMERCIAL

## 2022-01-12 PROCEDURE — 73560 X-RAY EXAM OF KNEE 1 OR 2: CPT | Mod: 26,LT

## 2022-01-12 PROCEDURE — 73560 X-RAY EXAM OF KNEE 1 OR 2: CPT

## 2022-02-08 ENCOUNTER — TRANSCRIPTION ENCOUNTER (OUTPATIENT)
Age: 47
End: 2022-02-08

## 2022-02-22 ENCOUNTER — OUTPATIENT (OUTPATIENT)
Dept: OUTPATIENT SERVICES | Facility: HOSPITAL | Age: 47
LOS: 1 days | End: 2022-02-22
Payer: COMMERCIAL

## 2022-02-22 PROCEDURE — 73560 X-RAY EXAM OF KNEE 1 OR 2: CPT

## 2022-02-22 PROCEDURE — 73560 X-RAY EXAM OF KNEE 1 OR 2: CPT | Mod: 26,LT

## 2022-04-19 ENCOUNTER — OUTPATIENT (OUTPATIENT)
Dept: OUTPATIENT SERVICES | Facility: HOSPITAL | Age: 47
LOS: 1 days | End: 2022-04-19
Payer: COMMERCIAL

## 2022-04-19 PROCEDURE — 73560 X-RAY EXAM OF KNEE 1 OR 2: CPT | Mod: 26,LT

## 2022-04-19 PROCEDURE — 73560 X-RAY EXAM OF KNEE 1 OR 2: CPT

## 2022-06-15 ENCOUNTER — TRANSCRIPTION ENCOUNTER (OUTPATIENT)
Age: 47
End: 2022-06-15

## 2022-06-28 ENCOUNTER — OUTPATIENT (OUTPATIENT)
Dept: OUTPATIENT SERVICES | Facility: HOSPITAL | Age: 47
LOS: 1 days | End: 2022-06-28
Payer: COMMERCIAL

## 2022-06-28 PROCEDURE — 73560 X-RAY EXAM OF KNEE 1 OR 2: CPT

## 2022-06-28 PROCEDURE — 73560 X-RAY EXAM OF KNEE 1 OR 2: CPT | Mod: 26,LT

## 2022-07-11 ENCOUNTER — NON-APPOINTMENT (OUTPATIENT)
Age: 47
End: 2022-07-11

## 2022-09-23 ENCOUNTER — OUTPATIENT (OUTPATIENT)
Dept: OUTPATIENT SERVICES | Facility: HOSPITAL | Age: 47
LOS: 1 days | End: 2022-09-23
Payer: COMMERCIAL

## 2022-09-23 ENCOUNTER — APPOINTMENT (OUTPATIENT)
Dept: CT IMAGING | Facility: HOSPITAL | Age: 47
End: 2022-09-23

## 2022-09-23 PROCEDURE — 73700 CT LOWER EXTREMITY W/O DYE: CPT | Mod: 26,LT

## 2022-09-23 PROCEDURE — 73700 CT LOWER EXTREMITY W/O DYE: CPT

## 2022-11-04 VITALS
HEART RATE: 90 BPM | OXYGEN SATURATION: 98 % | SYSTOLIC BLOOD PRESSURE: 116 MMHG | RESPIRATION RATE: 16 BRPM | WEIGHT: 223.77 LBS | DIASTOLIC BLOOD PRESSURE: 78 MMHG | TEMPERATURE: 97 F | HEIGHT: 70 IN

## 2022-11-04 NOTE — ASU PATIENT PROFILE, ADULT - FALL HARM RISK - UNIVERSAL INTERVENTIONS
Bed in lowest position, wheels locked, appropriate side rails in place/Call bell, personal items and telephone in reach/Instruct patient to call for assistance before getting out of bed or chair/Non-slip footwear when patient is out of bed/[place] to call system/Physically safe environment - no spills, clutter or unnecessary equipment/Purposeful Proactive Rounding/Room/bathroom lighting operational, light cord in reach

## 2022-11-04 NOTE — ASU PATIENT PROFILE, ADULT - HEALTHCARE QUESTIONS, PROFILE
Call to pt.  Advise per Dr Patel that stable appearing narrowing in the distal small intestine - continue current meds.  F/u as scheduled.    Need updated labs.    Pt verb understanding.  Has lab appt for 9/28 - asking if this soon enough, or if should reschedule sooner.    Question to Dr Patel.   SURGERY

## 2022-11-06 ENCOUNTER — TRANSCRIPTION ENCOUNTER (OUTPATIENT)
Age: 47
End: 2022-11-06

## 2022-11-07 ENCOUNTER — OUTPATIENT (OUTPATIENT)
Dept: OUTPATIENT SERVICES | Facility: HOSPITAL | Age: 47
LOS: 1 days | Discharge: HOME | End: 2022-11-07
Payer: COMMERCIAL

## 2022-11-07 ENCOUNTER — TRANSCRIPTION ENCOUNTER (OUTPATIENT)
Age: 47
End: 2022-11-07

## 2022-11-07 VITALS
RESPIRATION RATE: 12 BRPM | SYSTOLIC BLOOD PRESSURE: 109 MMHG | DIASTOLIC BLOOD PRESSURE: 63 MMHG | OXYGEN SATURATION: 99 % | HEART RATE: 99 BPM | TEMPERATURE: 97 F

## 2022-11-07 DIAGNOSIS — Z98.890 OTHER SPECIFIED POSTPROCEDURAL STATES: Chronic | ICD-10-CM

## 2022-11-07 PROCEDURE — C1713: CPT

## 2022-11-07 PROCEDURE — 20680 REMOVAL OF IMPLANT DEEP: CPT

## 2022-11-07 PROCEDURE — 29885 ARTHRS KNE SRG DRLG OST DISS: CPT | Mod: LT

## 2022-11-07 PROCEDURE — 76000 FLUOROSCOPY <1 HR PHYS/QHP: CPT

## 2022-11-07 DEVICE — IMPLANTABLE DEVICE
Type: IMPLANTABLE DEVICE | Status: NON-FUNCTIONAL
Removed: 2022-11-07

## 2022-11-07 RX ORDER — TIRZEPATIDE 15 MG/.5ML
0 INJECTION, SOLUTION SUBCUTANEOUS
Qty: 0 | Refills: 0 | DISCHARGE

## 2022-11-07 RX ORDER — SERTRALINE 25 MG/1
1 TABLET, FILM COATED ORAL
Qty: 0 | Refills: 0 | DISCHARGE

## 2022-11-07 RX ORDER — CHOLECALCIFEROL (VITAMIN D3) 125 MCG
1 CAPSULE ORAL
Qty: 0 | Refills: 0 | DISCHARGE

## 2022-11-07 RX ORDER — ROSUVASTATIN CALCIUM 5 MG/1
1 TABLET ORAL
Qty: 0 | Refills: 0 | DISCHARGE

## 2022-11-07 RX ORDER — HYDROMORPHONE HYDROCHLORIDE 2 MG/ML
0.5 INJECTION INTRAMUSCULAR; INTRAVENOUS; SUBCUTANEOUS
Refills: 0 | Status: DISCONTINUED | OUTPATIENT
Start: 2022-11-07 | End: 2022-11-07

## 2022-11-07 NOTE — ASU DISCHARGE PLAN (ADULT/PEDIATRIC) - NS MD DC FALL RISK RISK
For information on Fall & Injury Prevention, visit: [email or web address] OR  [email or web address] OR  https://www.cdc.gov/steadi/patient.html

## 2022-11-07 NOTE — PRE-ANESTHESIA EVALUATION ADULT - NSANTHADDINFOFT_GEN_ALL_CORE
Patient states he is suffering some mild cold symptoms today. Reports some mild sore throat and occasional cough. Denies fever, chills. As patient states he feels that symptoms have been improving and no fever/chills/myalgias will proceed with case.

## 2022-11-07 NOTE — ASU DISCHARGE PLAN (ADULT/PEDIATRIC) - CARE PROVIDER_API CALL
Pérez Lan)  Orthopaedic Surgery  [address]  [place], [place] [address]  Phone: [phone]  Fax: [phone]  Follow Up Time:    no

## 2022-11-10 ENCOUNTER — NON-APPOINTMENT (OUTPATIENT)
Age: 47
End: 2022-11-10

## 2022-11-13 ENCOUNTER — NON-APPOINTMENT (OUTPATIENT)
Age: 47
End: 2022-11-13

## 2022-11-15 ENCOUNTER — OUTPATIENT (OUTPATIENT)
Dept: OUTPATIENT SERVICES | Facility: HOSPITAL | Age: 47
LOS: 1 days | End: 2022-11-15
Payer: COMMERCIAL

## 2022-11-15 DIAGNOSIS — Z98.890 OTHER SPECIFIED POSTPROCEDURAL STATES: Chronic | ICD-10-CM

## 2022-11-15 PROCEDURE — 73560 X-RAY EXAM OF KNEE 1 OR 2: CPT

## 2022-11-15 PROCEDURE — 73560 X-RAY EXAM OF KNEE 1 OR 2: CPT | Mod: 26,LT

## 2022-12-14 ENCOUNTER — OUTPATIENT (OUTPATIENT)
Dept: OUTPATIENT SERVICES | Facility: HOSPITAL | Age: 47
LOS: 1 days | End: 2022-12-14
Payer: COMMERCIAL

## 2022-12-14 DIAGNOSIS — Z98.890 OTHER SPECIFIED POSTPROCEDURAL STATES: Chronic | ICD-10-CM

## 2022-12-14 PROCEDURE — 73560 X-RAY EXAM OF KNEE 1 OR 2: CPT | Mod: 26,LT

## 2022-12-14 PROCEDURE — 73560 X-RAY EXAM OF KNEE 1 OR 2: CPT

## 2023-01-25 ENCOUNTER — OUTPATIENT (OUTPATIENT)
Dept: OUTPATIENT SERVICES | Facility: HOSPITAL | Age: 48
LOS: 1 days | End: 2023-01-25
Payer: COMMERCIAL

## 2023-01-25 DIAGNOSIS — Z98.890 OTHER SPECIFIED POSTPROCEDURAL STATES: Chronic | ICD-10-CM

## 2023-01-25 PROCEDURE — 73560 X-RAY EXAM OF KNEE 1 OR 2: CPT

## 2023-01-25 PROCEDURE — 73560 X-RAY EXAM OF KNEE 1 OR 2: CPT | Mod: 26,LT

## 2023-04-05 ENCOUNTER — NON-APPOINTMENT (OUTPATIENT)
Age: 48
End: 2023-04-05

## 2023-04-25 ENCOUNTER — OUTPATIENT (OUTPATIENT)
Dept: OUTPATIENT SERVICES | Facility: HOSPITAL | Age: 48
LOS: 1 days | End: 2023-04-25
Payer: COMMERCIAL

## 2023-04-25 DIAGNOSIS — Z98.890 OTHER SPECIFIED POSTPROCEDURAL STATES: Chronic | ICD-10-CM

## 2023-04-25 PROCEDURE — 73560 X-RAY EXAM OF KNEE 1 OR 2: CPT

## 2023-04-25 PROCEDURE — 73560 X-RAY EXAM OF KNEE 1 OR 2: CPT | Mod: 26,LT

## 2023-06-14 NOTE — PRE-OP CHECKLIST - INTERNAL PROSTHESES
left knee hardware/yes(specify) Complex Repair And Double M Plasty Text: The defect edges were debeveled with a #15 scalpel blade.  The primary defect was closed partially with a complex linear closure.  Given the location of the remaining defect, shape of the defect and the proximity to free margins a double M plasty was deemed most appropriate for complete closure of the defect.  Using a sterile surgical marker, an appropriate advancement flap was drawn incorporating the defect and placing the expected incisions within the relaxed skin tension lines where possible.    The area thus outlined was incised deep to adipose tissue with a #15 scalpel blade.  The skin margins were undermined to an appropriate distance in all directions utilizing iris scissors.

## 2023-06-28 ENCOUNTER — OUTPATIENT (OUTPATIENT)
Dept: OUTPATIENT SERVICES | Facility: HOSPITAL | Age: 48
LOS: 1 days | End: 2023-06-28
Payer: COMMERCIAL

## 2023-06-28 DIAGNOSIS — Z98.890 OTHER SPECIFIED POSTPROCEDURAL STATES: Chronic | ICD-10-CM

## 2023-06-28 PROCEDURE — 73560 X-RAY EXAM OF KNEE 1 OR 2: CPT

## 2023-06-28 PROCEDURE — 73560 X-RAY EXAM OF KNEE 1 OR 2: CPT | Mod: 26,LT

## 2023-08-02 ENCOUNTER — NON-APPOINTMENT (OUTPATIENT)
Age: 48
End: 2023-08-02

## 2023-08-28 NOTE — PATIENT PROFILE ADULT - MEDICATIONS/VISITS
[FreeTextEntry3] : I have seen and examined this patient. Our NP Starla Garner has obtained a detailed history. The note contains my exam findings, impression and plan for this patient. no

## 2024-01-15 ENCOUNTER — NON-APPOINTMENT (OUTPATIENT)
Age: 49
End: 2024-01-15

## 2024-01-31 ENCOUNTER — NON-APPOINTMENT (OUTPATIENT)
Age: 49
End: 2024-01-31

## 2025-01-02 ENCOUNTER — NON-APPOINTMENT (OUTPATIENT)
Age: 50
End: 2025-01-02
